# Patient Record
Sex: MALE | Race: WHITE | NOT HISPANIC OR LATINO | Employment: UNEMPLOYED | URBAN - NONMETROPOLITAN AREA
[De-identification: names, ages, dates, MRNs, and addresses within clinical notes are randomized per-mention and may not be internally consistent; named-entity substitution may affect disease eponyms.]

---

## 2017-02-09 ENCOUNTER — COMMUNICATION - GICH (OUTPATIENT)
Dept: FAMILY MEDICINE | Facility: OTHER | Age: 37
End: 2017-02-09

## 2017-02-09 ENCOUNTER — HOSPITAL ENCOUNTER (OUTPATIENT)
Dept: RADIOLOGY | Facility: OTHER | Age: 37
End: 2017-02-09
Attending: FAMILY MEDICINE

## 2017-02-09 ENCOUNTER — HISTORY (OUTPATIENT)
Dept: FAMILY MEDICINE | Facility: OTHER | Age: 37
End: 2017-02-09

## 2017-02-09 ENCOUNTER — OFFICE VISIT - GICH (OUTPATIENT)
Dept: FAMILY MEDICINE | Facility: OTHER | Age: 37
End: 2017-02-09

## 2017-02-09 DIAGNOSIS — R40.20 COMA (H): ICD-10-CM

## 2017-02-09 DIAGNOSIS — G44.52 NEW DAILY PERSISTENT HEADACHE (NDPH): ICD-10-CM

## 2017-03-10 ENCOUNTER — COMMUNICATION - GICH (OUTPATIENT)
Dept: FAMILY MEDICINE | Facility: OTHER | Age: 37
End: 2017-03-10

## 2017-03-12 ENCOUNTER — HISTORY (OUTPATIENT)
Dept: FAMILY MEDICINE | Facility: OTHER | Age: 37
End: 2017-03-12

## 2017-03-12 ENCOUNTER — OFFICE VISIT - GICH (OUTPATIENT)
Dept: FAMILY MEDICINE | Facility: OTHER | Age: 37
End: 2017-03-12

## 2017-03-12 DIAGNOSIS — B97.89 OTHER VIRAL AGENTS AS THE CAUSE OF DISEASES CLASSIFIED ELSEWHERE: ICD-10-CM

## 2017-03-12 DIAGNOSIS — J02.0 STREPTOCOCCAL PHARYNGITIS: ICD-10-CM

## 2017-03-12 DIAGNOSIS — J02.9 ACUTE PHARYNGITIS: ICD-10-CM

## 2017-03-12 DIAGNOSIS — J06.9 ACUTE UPPER RESPIRATORY INFECTION: ICD-10-CM

## 2017-03-12 LAB — STREP A ANTIGEN - HISTORICAL: POSITIVE

## 2017-05-22 ENCOUNTER — AMBULATORY - GICH (OUTPATIENT)
Dept: SCHEDULING | Facility: OTHER | Age: 37
End: 2017-05-22

## 2017-05-22 ENCOUNTER — OFFICE VISIT - GICH (OUTPATIENT)
Dept: SCHEDULING | Facility: OTHER | Age: 37
End: 2017-05-22

## 2017-06-07 ENCOUNTER — AMBULATORY - GICH (OUTPATIENT)
Dept: SCHEDULING | Facility: OTHER | Age: 37
End: 2017-06-07

## 2017-06-23 ENCOUNTER — HISTORY (OUTPATIENT)
Dept: FAMILY MEDICINE | Facility: OTHER | Age: 37
End: 2017-06-23

## 2017-06-23 ENCOUNTER — OFFICE VISIT - GICH (OUTPATIENT)
Dept: FAMILY MEDICINE | Facility: OTHER | Age: 37
End: 2017-06-23

## 2017-06-23 DIAGNOSIS — J01.00 ACUTE MAXILLARY SINUSITIS: ICD-10-CM

## 2017-07-04 ENCOUNTER — COMMUNICATION - GICH (OUTPATIENT)
Dept: FAMILY MEDICINE | Facility: OTHER | Age: 37
End: 2017-07-04

## 2017-07-04 DIAGNOSIS — M10.072 IDIOPATHIC GOUT, LEFT ANKLE AND FOOT: ICD-10-CM

## 2017-07-04 DIAGNOSIS — E78.1 PURE HYPERGLYCERIDEMIA: ICD-10-CM

## 2017-07-10 ENCOUNTER — COMMUNICATION - GICH (OUTPATIENT)
Dept: FAMILY MEDICINE | Facility: OTHER | Age: 37
End: 2017-07-10

## 2017-07-10 DIAGNOSIS — M10.072 IDIOPATHIC GOUT, LEFT ANKLE AND FOOT: ICD-10-CM

## 2017-07-10 DIAGNOSIS — E78.1 PURE HYPERGLYCERIDEMIA: ICD-10-CM

## 2017-10-25 ENCOUNTER — HISTORY (OUTPATIENT)
Dept: FAMILY MEDICINE | Facility: OTHER | Age: 37
End: 2017-10-25

## 2017-10-25 ENCOUNTER — OFFICE VISIT - GICH (OUTPATIENT)
Dept: FAMILY MEDICINE | Facility: OTHER | Age: 37
End: 2017-10-25

## 2017-10-25 DIAGNOSIS — E78.1 PURE HYPERGLYCERIDEMIA: ICD-10-CM

## 2017-10-25 DIAGNOSIS — I10 ESSENTIAL (PRIMARY) HYPERTENSION: ICD-10-CM

## 2017-10-25 DIAGNOSIS — S39.012A STRAIN OF MUSCLE, FASCIA AND TENDON OF LOWER BACK, INITIAL ENCOUNTER: ICD-10-CM

## 2017-10-25 DIAGNOSIS — M10.072 IDIOPATHIC GOUT, LEFT ANKLE AND FOOT: ICD-10-CM

## 2017-12-27 NOTE — PROGRESS NOTES
Patient Information     Patient Name MRN Sex Nura Madsen [id number] Male 1980      Progress Notes by Justyna Arnold at 2017  9:32 AM     Author:  Justyna Arnold Service:  (none) Author Type:  (none)     Filed:  2017  9:32 AM Encounter Date:  2017 Status:  Signed     :  Justyna Arnold              This encounter was opened in error.  Please disregard.

## 2017-12-27 NOTE — PROGRESS NOTES
"Patient Information     Patient Name MRN Sex Nura Madsen [id number] Male 1980      Progress Notes by Angela Perez MD at 10/25/2017  9:30 AM     Author:  Angela Perez MD Service:  (none) Author Type:  Physician     Filed:  10/25/2017  2:43 PM Encounter Date:  10/25/2017 Status:  Signed     :  Angela Perez MD (Physician)            SUBJECTIVE:    Nura Gonzalez is a 37 y.o. male who complains of low back pain for 2 weeks and woke up with discomfort and can recall no injury but does have a son age 1 1/2 that he picks up and plays with which could have initiated it. He is working at a sedentary job but does get to change positions during the day and is currently a para in an elementary school. He hasn't done any heavy lifting raking or yard work recently. He has gained quite a bit of weight over the last year and has been on Abilify from Dr. Blas. He does know that he should lose some weight. The pain is in the lumbar area really does not radiate and is central and bilateral quite low. He has no real SI joint tenderness today.    OBJECTIVE:  /92  Pulse 72  Ht 1.85 m (6' 0.83\")  Wt 132 kg (291 lb)  BMI 38.57 kg/m2   Wt Readings from Last 4 Encounters:    10/25/17 132 kg (291 lb)   17 129.3 kg (285 lb)   17 127 kg (280 lb)   17 122 kg (269 lb)        Patient appears to be in mild discomfort Lumbosacral spine area reveals no local tenderness or mass.  Paraspinal muscle spasm noted bilaterally and is fairly symmetric. He can flex forward without significant difficulty and extend at the lumbar spine. No evidence of antalgic gait or scoliosis or curvature. Extremely large abdominal girth.    ASSESSMENT:   1. Strain of lumbar region, initial encounter    2. Acute idiopathic gout involving toe of left foot    3. Hypertriglyceridemia    4. Hypertension          PLAN:  For acute pain, rest, intermittent application of heat (do not sleep on heating pad), " analgesics-ibuprofen or Aleve  and muscle relaxants-prescription for Flexeril at night are recommended. Discussed longer term treatment plan of prn NSAID's and discussed a home back care exercise program with flexion exercise routine.  Consider Physical Therapy or chiropractic and XRay studies if not improving. Call or return to clinic prn if these symptoms worsen or fail to improve as anticipated.  Refills needed on a couple of medications and is advised to follow-up for a full physical and lab.  Angela Perez MD  2:42 PM 10/25/2017     Portions of this dictation were created using the Dragon Nuance voice recognition system. Proofreading was completed but there may be errors in text.

## 2017-12-28 NOTE — PATIENT INSTRUCTIONS
Patient Information     Patient Name MRN Sex Nura Madsen [id number] Male 1980      Patient Instructions by Maria Teresa Thakur NP at 2017  2:45 PM     Author:  Maria Teresa Thakur NP Service:  (none) Author Type:  PHYS- Nurse Practitioner     Filed:  2017  3:25 PM Encounter Date:  2017 Status:  Signed     :  Maria Teresa Thakur NP (PHYS- Nurse Practitioner)            What you should do:    Wash your hands often with soap and water    Get plenty of rest and fluids    Apply warm compresses to your face    Use sinus rinses, a humidifier or a vaporizer to add more moisture to your sinus tissues.    Think about using a Neti pot    Take acetaminophen (Tylenol ) or ibuprofen (Advil ) for pain    How will you know this plan is not working - warning signs you should watch for:    Pain or swelling around your eyes    Swollen, painful forehead and/or facial (sinus) pain    When should you be seen again?    If you develop severe headache, double vision, stiff neck or confusion, return right away.    If you have any of the warning signs listed above, return in 1 to 2 days.    If your symptoms do not get better in 7 to 10 days, return at that time.

## 2017-12-28 NOTE — PROGRESS NOTES
Patient Information     Patient Name MRN Sex     Nura Gonzalez [id number] Male 1980      Progress Notes by Maria Teresa Thakur NP at 2017  2:45 PM     Author:  Maria Teresa Thakur NP Service:  (none) Author Type:  PHYS- Nurse Practitioner     Filed:  2017  3:31 PM Encounter Date:  2017 Status:  Signed     :  Maria Teresa Thakur NP (PHYS- Nurse Practitioner)            Nursing Notes:   Leslee Jerome BETTY  2017  3:24 PM  Signed  Patient is here today with c/o pain in his teeth, drainage in the back of his throat, and yellow/green mucus. Leslee Jerome LPN......................2017 3:04 PM    SUBJECTIVE:    Nuar Gonzalez is a 37 y.o. male who presents for Sinus pain and pressure.     Sinus Problem   This is a new problem. Episode onset: 4 weeks. The problem is unchanged. There has been no fever. The pain is moderate. Associated symptoms include congestion, headaches and sinus pressure. Pertinent negatives include no coughing, diaphoresis, ear pain, hoarse voice, neck pain, shortness of breath, sneezing, sore throat or swollen glands. Past treatments include oral decongestants, saline sprays and spray decongestants. The treatment provided mild relief.       Current Outpatient Prescriptions on File Prior to Visit       Medication  Sig Dispense Refill     allopurinol (ZYLOPRIM) 300 mg tablet Take 1 tablet by mouth once daily. 90 tablet 4     ARIPiprazole (ABILIFY) 10 mg tablet Take 1 tablet by mouth once daily.  0     clonazePAM (KLONOPIN) 1 mg tablet TAKE ONE TABLET BY MOUTH TWICE DAILY 40 tablet 0     fenofibrate nanocrystallized (TRICOR) 145 mg tablet Take 1 tablet by mouth once daily with a meal. 90 tablet 4     methylphenidate (CONCERTA) 36 mg Extended-Release tablet Take 1 tablet by mouth once daily. Dx: ADHD  0     omega-3 fatty acids-vitamin E (FISH OIL) 1,000 mg cap 4 DAILY  0     propranolol (INDERAL LA) 120 mg Cs24 Sustained-Release capsule Take 1 capsule by mouth once daily.  0  "    No current facility-administered medications on file prior to visit.        REVIEW OF SYSTEMS:  Review of Systems   Constitutional: Negative for diaphoresis.   HENT: Positive for congestion and sinus pressure. Negative for ear pain, hoarse voice, sneezing and sore throat.    Respiratory: Negative for cough and shortness of breath.    Musculoskeletal: Negative for neck pain.   Neurological: Positive for headaches.       OBJECTIVE:  /80  Pulse 88  Temp 97.6  F (36.4  C) (Tympanic)  Ht 1.854 m (6' 1\")  Wt 129.3 kg (285 lb)  BMI 37.6 kg/m2    EXAM:   Physical Exam   Constitutional: He is well-developed, well-nourished, and in no distress.   HENT:   Head: Normocephalic and atraumatic.   Right Ear: Tympanic membrane and ear canal normal.   Left Ear: Tympanic membrane and ear canal normal.   Nose: Mucosal edema present. Right sinus exhibits no maxillary sinus tenderness and no frontal sinus tenderness. Left sinus exhibits maxillary sinus tenderness. Left sinus exhibits no frontal sinus tenderness.   Eyes: Conjunctivae are normal.   Neck: Neck supple.   Cardiovascular: Normal rate, regular rhythm and normal heart sounds.    Pulmonary/Chest: Effort normal and breath sounds normal. No respiratory distress. He has no wheezes. He has no rales.   Lymphadenopathy:     He has no cervical adenopathy.   Skin: Skin is warm and dry.   Nursing note and vitals reviewed.      ASSESSMENT/PLAN:    ICD-10-CM    1. Acute non-recurrent maxillary sinusitis J01.00 amoxicillin-clavulanate 875-125 mg tablet (AUGMENTIN)        Plan:  Home cares and OTC gone over. ABX gone over. I explained my diagnostic considerations and recommendations to the patient, who voiced understanding and agreement with the treatment plan. All questions were answered. We discussed potential side effects of any prescribed or recommended therapies, as well as expectations for response to treatments. He was advised to contact our office if there is no " improvement or worsening of conditions or symptoms.  If s/s worsen or persist, patient will either come back or follow up with PCP.       BUBBA ROMANO NP ....................  6/23/2017   3:30 PM

## 2017-12-28 NOTE — TELEPHONE ENCOUNTER
Patient Information     Patient Name MRN Sex Nura Madsen [id number] Male 1980      Telephone Encounter by Shila Sage RN at 7/10/2017  2:33 PM     Author:  Shila Sage RN Service:  (none) Author Type:  NURS- Registered Nurse     Filed:  7/10/2017  2:36 PM Encounter Date:  7/10/2017 Status:  Signed     :  Shila Sage RN (NURS- Registered Nurse)            Fibrates    Office visit in the past 12 months.    Last visit with KASHIF HANEY was on: 2016 in [facility]  Next visit with KASHIF HANEY is on: No future appointment listed with this provider  Next visit with Family Practice is on: No future appointment listed in this department    Lab testing requirements:  Lipids annually.  Repeat lipids 6-8 weeks after dosage or drug change.    Last Lipids:  Chol: 204    2016  T    2016  HDL:   25    2016  LDL:  No results found in past 5 years    .  LDL DIRECT:  No results found in past 5 years    .    Concommitant use of fibrates and statins-If it is an addition to the medication list, review note and/or discuss with provider.  If already on medication list, refill.    Max refills 12 months from last office visit.      GOUT    Office visit in the past 12 months or per provider note.    Max refill for 12 months from last office visit or per provider note.    Patient is due for medication management appointment. Limited refill provided at this time. [employer] message and/or letter sent for reminder to patient. Prescription refilled per RN Medication Refill Policy.................... Shila Sage RN ....................  7/10/2017   2:35 PM

## 2017-12-28 NOTE — PATIENT INSTRUCTIONS
Patient Information     Patient Name MRN Sex Nura Madsen [id number] Male 1980      Patient Instructions by Angela Perez MD at 10/25/2017 10:00 AM     Author:  Angela Perez MD  Service:  (none) Author Type:  Physician     Filed:  10/25/2017 10:00 AM  Encounter Date:  10/25/2017 Status:  Addendum     :  Angela Perez MD (Physician)        Related Notes: Original Note by Angela Perez MD (Physician) filed at 10/25/2017 10:00 AM               Index Swedish All languages Exercises Related topics   Low Back Pain   ________________________________________________________________________  KEY POINTS    Pain and stiffness in the lower back is a common condition.    Low back pain can be treated with exercise, ice, moist heat, and sometimes with medicine or surgery.    Keeping your muscles strong, using good posture, and learning the correct way to lift heavy objects can help prevent problems.  ________________________________________________________________________  What is low back pain?   Pain and stiffness in the lower back is a common condition. It is one of the most common reasons people miss work.   In the center of your lower back are 5 bones in the spine called lumbar vertebrae. Muscles and ligaments help keep the vertebrae in their proper position. In between the vertebrae are gel-like shock absorbers called disks. Nerves that lead to the lower body pass through the bones of the lower back.   What is the cause?  You may have pain if any part of your back is injured, strained, or affected by illness.   The most common causes of back pain include:    Frequent lifting or carrying of heavy objects    Spending a lot of time sitting or standing in one position or bending over    Being overweight    A degenerative condition (a problem that causes the bones, joints, disks, or muscles to break down, like arthritis)  Less common causes of back pain include:    A disk that bulges or is  pushed out of place by injury or a severe strain. A bulging (herniated) disk can pinch the nerves that pass through the bones, leading to pain in the legs.    Injuries caused by a fall, unusually strenuous exercise, or even violent sneezing or coughing    Swelling and irritation from an infection or an immune system problem    A congenital condition (a problem that you were born with)  What are the symptoms?  Symptoms include:    Pain in the back, buttocks, or legs    Weakness in the legs    Tingling or numbness in the legs or feet    Stiffness, spasms, or limited motion  The pain may be constant or may happen only in certain positions. It may get worse when you cough, sneeze, bend, twist, or strain during a bowel movement. The pain may be in only one spot or it may spread to other areas, most commonly down the buttocks and into the back of the thigh.  How is it diagnosed?   Your healthcare provider will review your medical history and examine you. You may have X-rays or an MRI of your back.  How is it treated?   The treatment for low back pain depends on the cause. Your healthcare provider may recommend:    Rest. It's best to try to stay active, so try not to rest in bed longer than 1 to 2 days or the time your provider recommends.    Exercise. Your provider may recommend physical therapy or exercises that you can do at home.    Medicine. Several types of medicines may help lessen back pain. Take all medicine as recommended by your healthcare provider.    Surgery. Depending on the cause of your back pain and if you keep having symptoms, you may need to have surgery. However, most common causes of back pain don t need surgery.  How can I take care of myself?   To help relieve pain:    Put an ice pack, gel pack, or package of frozen vegetables, wrapped in a cloth on the painful area every 3 to 4 hours for up to 20 minutes at a time.    Take an anti-inflammatory medicine, such as ibuprofen, or other medicine as  directed by your healthcare provider. Nonsteroidal anti-inflammatory medicines (NSAIDs), such as ibuprofen, may cause stomach bleeding and other problems. These risks increase with age. Read the label and take as directed. Unless recommended by your healthcare provider, do not take for more than 10 days.    Put a hot water bottle or electric heating pad on your back. Cover the hot water bottle with a towel or set the heating pad on low so you don t burn your skin.   Try putting moist heat on the injured area for 10 to 15 minutes at a time before you do warm-up and stretching exercises. Moist heat may help relax your muscles and make it easier to move your body. Moist heat includes heat patches or moist heating pads that you can purchase at most drugstores, a wet washcloth or towel that has been heated in the dryer, or a hot shower.   Don t use heat if you have swelling.     Get a back massage by someone trained in giving massages.    Talk with a counselor if your back pain is related to tension caused by emotional problems.  Pain is the best way to  the pace you should set for increasing your activity and exercise. Minor discomfort, stiffness, soreness, and mild aches don t need to limit your activity.   Ask your healthcare provider:    How and when you will get your test results    How long it will take to recover from this condition    If there are activities you should avoid and when you can return to your normal activities    How to take care of yourself at home    What symptoms or problems you should watch for and what to do if you have them  Make sure you know when you should come back for a checkup.  How can I help prevent low back pain?   Here are some of the things you can do so there is less strain on your back:    Keep your abdominal and back muscles strong. Get some exercise every day and include stretching and warm-up exercises suggested by your provider or physical therapist. Exercising regularly  will not only help your back. It will also help keep you healthier overall.    Practice good posture.    Stand with your head up, shoulders straight, chest forward, weight balanced evenly on both feet, and pelvis tucked in.    Whenever you sit, sit in a straight-backed chair and hold your spine against the back of the chair. You may want to try a standing desk if you sit at a desk for a long time.    Use a footrest for one foot when you stand or sit in one spot for a long time. This keeps your back straight.    Protect your back.    When you need to move a heavy object, don't face the object and push with your arms. Turn around and use your back to push backwards so the strain is taken by your legs.    When you lift a heavy object, bend your knees and hips and keep your back straight. If you do a lot of heavy lifting, wear a belt designed to support your back. Avoid lifting heavy objects higher than your waist.    Carry packages close to your body, with your arms bent.    Lie on your side with your knees bent when you sleep or rest. It may help to put a pillow between your knees. Put a pillow under your knees when you sleep on your back. You may need to avoid sleeping on your stomach.    Lose weight if you are overweight.  Developed by [employer].  Adult Advisor 2016.3 published by [employer].  Last modified: 2016-05-19  Last reviewed: 2016-05-18  This content is reviewed periodically and is subject to change as new health information becomes available. The information is intended to inform and educate and is not a replacement for medical evaluation, advice, diagnosis or treatment by a healthcare professional.  References   Adult Advisor 2016.3 Index    Copyright   2016 [employer], a division of [employer] All rights reserved.

## 2017-12-28 NOTE — TELEPHONE ENCOUNTER
Patient Information     Patient Name MRN Sex Nura Madsen [id number] Male 1980      Telephone Encounter by Lady Butterfield RN at 2017  3:22 PM     Author:  Lady Butterfield RN Service:  (none) Author Type:  NURS- Registered Nurse     Filed:  2017  3:22 PM Encounter Date:  2017 Status:  Signed     :  Lady Butterfield RN (NURS- Registered Nurse)            Both refilled 17 for 90 days.  Unable to complete prescription refill per RN Medication Refill Policy.................... LADY BUTTERFIELD RN ....................  2017   3:22 PM

## 2017-12-30 NOTE — NURSING NOTE
Patient Information     Patient Name MRN Sex Nura Madsen [id number] Male 1980      Nursing Note by Leslee Jerome at 2017  3:00 PM     Author:  Leslee Jerome Service:  (none) Author Type:  (none)     Filed:  2017  3:24 PM Encounter Date:  2017 Status:  Signed     :  Leslee Jerome            Patient is here today with c/o pain in his teeth, drainage in the back of his throat, and yellow/green mucus. Leslee Jerome LPN......................2017 3:04 PM

## 2018-01-02 ENCOUNTER — HISTORY (OUTPATIENT)
Dept: FAMILY MEDICINE | Facility: OTHER | Age: 38
End: 2018-01-02

## 2018-01-02 ENCOUNTER — OFFICE VISIT - GICH (OUTPATIENT)
Dept: FAMILY MEDICINE | Facility: OTHER | Age: 38
End: 2018-01-02

## 2018-01-02 DIAGNOSIS — J02.0 STREPTOCOCCAL PHARYNGITIS: ICD-10-CM

## 2018-01-02 DIAGNOSIS — J02.9 ACUTE PHARYNGITIS: ICD-10-CM

## 2018-01-02 LAB — STREP A ANTIGEN - HISTORICAL: POSITIVE

## 2018-01-03 NOTE — TELEPHONE ENCOUNTER
"Patient Information     Patient Name MRN Sex Nura Madsen [id number] Male 1980      Telephone Encounter by Yanelis Peacock RN at 2017  9:05 AM     Author:  Yanelis Peacock RN Service:  (none) Author Type:  NURS- Registered Nurse     Filed:  2017  9:14 AM Encounter Date:  2017 Status:  Signed     :  Yanelis Peacock RN (NURS- Registered Nurse)            Patient does not have a ride at this time, he is waiting for his wife to get home. He does have an appointment scheduled for this afternoon, but he will present to ED if his wife is able to bring him in sooner. He was advised to call 911 with worsening symptoms.    Reason for Disposition    Any head or face injury    Answer Assessment - Initial Assessment Questions  1. ONSET: \"How long were you unconscious?\" (minutes) \"When did it happen?\"      10-10:30 pm last night, out for just a few seconds  2. CONTENT: \"What happened during period of unconsciousness?\" (e.g., seizure activity)       unknown  3. MENTAL STATUS: \"Alert and oriented now?\" (oriented x 3 = name, month, location)       Oriented x3  4. TRIGGER: \"What do you think caused the fainting?\" \"What were you doing just before you fainted?\"  (e.g., exercise, sudden standing up, prolonged standing)      Walking to the garbage to throw something away  5. RECURRENT SYMPTOM: \"Have you ever passed out before?\" If so, ask: \"When was the last time?\" and \"What happened that time?\"       no  6. INJURY: \"Did you sustain any injury during the fall?\"       Back and head are sore, states he was face down when his wife woke him up  7. CARDIAC SYMPTOMS: \"Have you had any of the following symptoms: chest pain, difficulty breathing, palpitations?\"      No  8. NEUROLOGIC SYMPTOMS: \"Have you had any of the following symptoms: headache, numbness, vertigo, weakness?\"      Headache   9. GI SYMPTOMS: \"Have you had any of the following symptoms: abdominal pain, vomiting, diarrhea, blood in " "stools?\"      no  10. OTHER SYMPTOMS: \"Do you have any other symptoms?\"      no  11. PREGNANCY: \"Is there any chance you are pregnant?\" \"When was your last menstrual period?\"      N/a - male    Protocols used: ADULT [id number]            "

## 2018-01-03 NOTE — PATIENT INSTRUCTIONS
Patient Information     Patient Name MRN Sex Nura Madsen [id number] Male 1980      Patient Instructions by Araceli Womack NP at 3/12/2017  2:30 PM     Author:  Araceli Womack NP Service:  (none) Author Type:  PHYS- Nurse Practitioner     Filed:  3/12/2017  3:28 PM Encounter Date:  3/12/2017 Status:  Signed     :  Araceli Womack NP (PHYS- Nurse Practitioner)            Positive rapid strep test    What you should do:    If you are prescribed antibiotics:    take all the medicine as directed    get a new toothbrush to start using 24 hours after starting the antibiotics    stay home from school or work until you have been on antibiotics for at least 24 hours    Get plenty of fluids to stay well hydrated    Make sure that you get plenty of rest    Take acetaminophen (Tylenol ) or ibuprofen (Motrin , Advil ) for fever or discomfort if needed.  Follow your health care provider s or the package directions.       Eating freezer treats, such as Popsicles  and ice cream can ease sore throat pain    If you had a throat culture you should receive the results within 3 days. Please call the clinic if you have not been notified of your results after 3 days.    How will you know this plan is not working - warning signs you should watch for:    You get new symptoms you are worried about    You:  o have little energy  o are hard to wake up  o don t want to drink fluids  o have little or lack of urine    When should you be seen again?    If you have trouble swallowing your saliva, go to the Emergency Room right away    If you have any of the symptoms listed, above return right away    If your fever or throat pain does not improve within three days, return at that time    Who should you see if the plan is not working?    Make an appointment to see your primary care provider or clinic.      Follow up in clinic if:  You have a fever of at least 101 F or 38.4 C   Your throat pain is severe or does not  start to improve within 5 to 7 days    Call 9-1-1 or go to the emergency room if you:  Have trouble breathing   Are drooling because you cannot swallow your saliva   Have swelling of the neck or tongue   Cannot move your neck or have trouble opening your mouth

## 2018-01-03 NOTE — TELEPHONE ENCOUNTER
Patient Information     Patient Name MRN Sex Nura Madsen [id number] Male 1980      Telephone Encounter by Angela Perez MD at 3/10/2017 10:35 AM     Author:  Angela Perez MD Service:  (none) Author Type:  Physician     Filed:  3/10/2017 10:36 AM Encounter Date:  3/10/2017 Status:  Signed     :  Angela Perez MD (Physician)            This patient has a sleep study ordered and it does not  in till 2017.  I suspect that they were unable to reach him to schedule this. Please initiated through the schedulers.  Angela Perez MD  10:36 AM 3/10/2017

## 2018-01-03 NOTE — PROGRESS NOTES
Patient Information     Patient Name MRN Sex Nura Madsen [id number] Male 1980      Progress Notes by Araceli Womack NP at 3/12/2017  2:30 PM     Author:  Araceli Womack NP Service:  (none) Author Type:  PHYS- Nurse Practitioner     Filed:  3/12/2017  4:23 PM Encounter Date:  3/12/2017 Status:  Signed     :  Araceli Womack NP (PHYS- Nurse Practitioner)            HPI:    Nura Gonzalez is a 37 y.o. male who presents to clinic today for strep testing.   Sore throat x 3 days.  Painful to swallow.  Loss of taste.  Runny and stuffy nose, clearing up now during day but still present at night.  Congested cough.  No chest tightness.  No shortness of breath.  Fevers up to 103.6 at night, reduces with Ibuprofen.  Chills and sweats.  Appetite decreased.  Energy decreased.  Headaches.  Body aches.  Just taking Ibuprofen.  No flu shot.              No past medical history on file.  Past Surgical History       Procedure   Laterality Date     Remvoval of sesamoid bone in foot  Left      [place] teeth extraction        Knee arthroscopy  Right      Esophagogastroduodenoscopy   3/5/2014             Social History        Substance Use Topics          Smoking status:   Former Smoker      Packs/day:  1.00      Years:  15.00      Quit date:  2013      Smokeless tobacco:   Never Used      Alcohol use   0.0 oz/week     0 Standard drinks or equivalent per week        Comment: RARE       Current Outpatient Prescriptions       Medication  Sig Dispense Refill     allopurinol (ZYLOPRIM) 300 mg tablet Take 1 tablet by mouth once daily. 90 tablet 4     ARIPiprazole (ABILIFY) 10 mg tablet Take 1 tablet by mouth once daily.  0     clonazePAM (KLONOPIN) 1 mg tablet TAKE ONE TABLET BY MOUTH TWICE DAILY 40 tablet 0     fenofibrate nanocrystallized (TRICOR) 145 mg tablet Take 1 tablet by mouth once daily with a meal. 90 tablet 4     methylphenidate (CONCERTA) 36 mg Extended-Release tablet Take 1 tablet by mouth once  "daily. Dx: ADHD  0     omega-3 fatty acids-vitamin E (FISH OIL) 1,000 mg cap 4 DAILY  0     propranolol (INDERAL LA) 120 mg Cs24 Sustained-Release capsule Take 1 capsule by mouth once daily.  0     No current facility-administered medications for this visit.      Medications have been reviewed by me and are current to the best of my knowledge and ability.    No Known Allergies    ROS:  Refer to HPI    Visit Vitals       /84     Pulse 68     Temp 98.6  F (37  C) (Tympanic)     Ht 1.88 m (6' 2\")     Wt 122 kg (269 lb)     BMI 34.54 kg/m2       EXAM:  General Appearance:   Miserable appearing adult male, appropriate appearance for age. No acute distress  Head: normocephalic, atraumatic  Ears: Left TM with bony landmarks appreciated, no erythema, no effusion, no bulging, no purulence.  Right TM with bony landmarks appreciated, no erythema, no effusion, no bulging, no purulence.   Left auditory canal clear.  Right auditory canal clear.  Normal external ears, non tender.  Eyes: conjunctivae normal, no drainage  Orophayrnx: moist mucous membranes, posterior pharynx with bright erythema, tonsils with 1-2+ hypertrophy with erythema, no exudates or petechiae, no post nasal drip seen.    Neck: supple without adenopathy, no lymph node enlargement on palpation, tender to palpation over right tonsillar area   Respiratory: normal chest wall and respirations.  Normal effort.  Clear to auscultation bilaterally, no wheezes or rhonchi or congestion, no cough appreciated   Cardiac: RRR with no murmurs  Psychological: normal affect, alert and pleasant      Labs:  Results for orders placed or performed in visit on 03/12/17      THROAT RAPID STREP A WITH REFLEX      Result  Value Ref Range    STREP A ANTIGEN           Positive (A) Negative           ASSESSMENT/PLAN:    ICD-10-CM    1. Sore throat J02.9 THROAT RAPID STREP A WITH REFLEX      THROAT RAPID STREP A WITH REFLEX   2. Strep pharyngitis J02.0 penicillin g benzathine " 1,200,000 Units injection (BICILLIN-LA)   3. Viral URI with cough J06.9      B97.89          Positive rapid strep test  Penicillin G Benzathine 1.2 million units IM injection x 1 administered in clinic  New toothbrush in 24 hours   Encouraged fluids  Symptomatic treatment - salt water gargles, honey, elevation, humidifier, sinus rinse/netti pot, lozenges, etc   Tylenol or ibuprofen PRN  Follow up if symptoms persist or worsen or concerns            Patient Instructions   Positive rapid strep test    What you should do:    If you are prescribed antibiotics:    take all the medicine as directed    get a new toothbrush to start using 24 hours after starting the antibiotics    stay home from school or work until you have been on antibiotics for at least 24 hours    Get plenty of fluids to stay well hydrated    Make sure that you get plenty of rest    Take acetaminophen (Tylenol ) or ibuprofen (Motrin , Advil ) for fever or discomfort if needed.  Follow your health care provider s or the package directions.       Eating freezer treats, such as Popsicles  and ice cream can ease sore throat pain    If you had a throat culture you should receive the results within 3 days. Please call the clinic if you have not been notified of your results after 3 days.    How will you know this plan is not working - warning signs you should watch for:    You get new symptoms you are worried about    You:  o have little energy  o are hard to wake up  o don t want to drink fluids  o have little or lack of urine    When should you be seen again?    If you have trouble swallowing your saliva, go to the Emergency Room right away    If you have any of the symptoms listed, above return right away    If your fever or throat pain does not improve within three days, return at that time    Who should you see if the plan is not working?    Make an appointment to see your primary care provider or clinic.      Follow up in clinic if:  You have a fever  of at least 101 F or 38.4 C   Your throat pain is severe or does not start to improve within 5 to 7 days    Call 9-1-1 or go to the emergency room if you:  Have trouble breathing   Are drooling because you cannot swallow your saliva   Have swelling of the neck or tongue   Cannot move your neck or have trouble opening your mouth

## 2018-01-03 NOTE — NURSING NOTE
Patient Information     Patient Name MRN Sex Nura Madsen [id number] Male 1980      Nursing Note by Rayna Jade at 2017  3:00 PM     Author:  Rayna Jade Service:  (none) Author Type:  (none)     Filed:  2017  3:23 PM Encounter Date:  2017 Status:  Signed     :  Rayna Jade            Patient presents with a head injury. States he blacked out last night and hit his head. He is having headaches.  Rayna Jade LPN .........................2017  3:01 PM

## 2018-01-03 NOTE — TELEPHONE ENCOUNTER
Patient Information     Patient Name MRN Sex Nura Madsen [id number] Male 1980      Telephone Encounter by Padmini Herrera at 3/10/2017 10:24 AM     Author:  Padmini Herrera Service:  (none) Author Type:  (none)     Filed:  3/10/2017 10:25 AM Encounter Date:  3/10/2017 Status:  Signed     :  Padmini Herrera            Please see 2016 note  Padmini Herrera LPN........................3/10/2017  10:25 AM

## 2018-01-03 NOTE — PROGRESS NOTES
Patient Information     Patient Name MRN Sex Nura Madsen [id number] Male 1980      Progress Notes by Andi Yao MD at 2017  3:00 PM     Author:  Andi Yao MD Service:  (none) Author Type:  Physician     Filed:  2/10/2017  8:01 AM Encounter Date:  2017 Status:  Signed     :  Andi Yao MD (Physician)            SUBJECTIVE:    Nura Gonzalez is a 37 y.o. male who presents for sudden loss of consciousness    HPI Comments: Patient arrives here after experiencing a sudden loss of consciousness. Approximately 10:00 last night he had just finished eating brussels sprouts when he is walking to the garage. He suddenly lost consciousness. His significant other was in bed reading heard a loud crash. She subsequently went home and found him on the floor trying to get up. States he was confused at the time but cleared up after a minute or 2. States he hit the floor hard. Does not know if he had any trauma to his head reports he had a severe headache afterwards. He did not have any incontinence or biting his tongue. The headache has improved but continues to persist on the left side. No history of loss of consciousness in the past. He denies any palpitations prior to the episode. No seizures or shaking prior to the episode. He became suddenly. He had been standing prior to the episode.      No Known Allergies,   Family History       Problem   Relation Age of Onset     Hyperlipidemia  Mother      Hypertension  Mother      Psychiatric illness  Mother      Depression        Other  Mother      Chronic back pain       Diabetes  Brother      Other  Brother      Tourette's       Diabetes  Other      Nephew-type 1     ,   Current Outpatient Prescriptions on File Prior to Visit       Medication  Sig Dispense Refill     allopurinol (ZYLOPRIM) 300 mg tablet Take 1 tablet by mouth once daily. 90 tablet 4     ARIPiprazole (ABILIFY) 10 mg tablet Take 1 tablet by mouth once daily.  0      clonazePAM (KLONOPIN) 1 mg tablet TAKE ONE TABLET BY MOUTH TWICE DAILY 40 tablet 0     fenofibrate nanocrystallized (TRICOR) 145 mg tablet Take 1 tablet by mouth once daily with a meal. 90 tablet 4     methylphenidate (CONCERTA) 36 mg Extended-Release tablet Take 1 tablet by mouth once daily. Dx: ADHD  0     omega-3 fatty acids-vitamin E (FISH OIL) 1,000 mg cap 4 DAILY  0     propranolol (INDERAL LA) 120 mg Cs24 Sustained-Release capsule Take 1 capsule by mouth once daily.  0     No current facility-administered medications on file prior to visit.    ,   Past Surgical History       Procedure   Laterality Date     Remvoval of sesamoid bone in foot  Left      [place] teeth extraction        Knee arthroscopy  Right      Esophagogastroduodenoscopy   3/5/2014           ,   Social History        Substance Use Topics          Smoking status:   Former Smoker      Packs/day:  1.00      Years:  15.00      Quit date:  8/17/2013      Smokeless tobacco:   Never Used      Alcohol use   0.0 oz/week     0 Standard drinks or equivalent per week        Comment: RARE      and   Social History        Substance Use Topics          Smoking status:   Former Smoker      Packs/day:  1.00      Years:  15.00      Quit date:  8/17/2013      Smokeless tobacco:   Never Used      Alcohol use   0.0 oz/week     0 Standard drinks or equivalent per week        Comment: RARE         REVIEW OF SYSTEMS:  Review of Systems   Constitutional: Negative for chills, fever and malaise/fatigue.   Eyes: Negative for blurred vision.   Respiratory: Negative for cough.    Cardiovascular: Negative for chest pain and palpitations.   Gastrointestinal: Negative for heartburn, nausea and vomiting.   Musculoskeletal: Negative for neck pain.   Skin: Negative for rash.   Neurological: Positive for headaches. Negative for dizziness, sensory change and speech change.   Psychiatric/Behavioral: Negative for memory loss.       OBJECTIVE:  /72  Pulse 66  Temp 97.6  F (36.4   C) (Tympanic)  Wt 123.4 kg (272 lb)  BMI 36.36 kg/m2    EXAM:   Physical Exam   Constitutional: He is well-developed, well-nourished, and in no distress.   HENT:   Head: Normocephalic and atraumatic.   Right Ear: External ear normal.   Left Ear: External ear normal.   Mouth/Throat: No oropharyngeal exudate.   Eyes: Conjunctivae and EOM are normal. Pupils are equal, round, and reactive to light. No scleral icterus.   Neck: Normal range of motion. Neck supple.   Cardiovascular: Normal rate, regular rhythm and normal heart sounds.    No murmur heard.  Pulmonary/Chest: Effort normal and breath sounds normal.   Abdominal: Soft.   Neurological: He is alert. He displays normal reflexes. Gait normal.   Past alternating movement finger-nose-finger normal. Romberg unremarkable. No clonus present.   Skin: Skin is warm and dry.   Psychiatric: Affect normal.   CT scan negative for bleed. EKG sinus rhythm.   ASSESSMENT/PLAN:    ICD-10-CM    1. LOC (loss of consciousness) (HC) R40.20 CT HEAD BRAIN WO      SC ELECTROCARDIOGRAM COMPLETE   2. New daily persistent headache G44.52 EKG 12 LEAD UNIT PERFORMED        Plan:  Episode of loss of consciousness. Possibly a vasovagal. Etiology is unclear. He does not give a history of an arrhythmia. No history of seizure. Discussed potential possibilities with the patient and significant other. At this time recommended just monitoring for now. They are agreeable. He denies any new medications. No other changes in his health. Further workup if symptoms should recur.

## 2018-01-03 NOTE — NURSING NOTE
Patient Information     Patient Name MRN Sex Nura Madsen [id number] Male 1980      Nursing Note by Deja Calderon at 3/12/2017  2:30 PM     Author:  Deja Calderon Service:  (none) Author Type:  (none)     Filed:  3/12/2017  3:08 PM Encounter Date:  3/12/2017 Status:  Signed     :  Deja Calderon            He has had a sore throat for 4 days. He states he has a fever off and on but especially at night.  Deja Calderon LPBETTY..................3/12/2017   3:04 PM

## 2018-01-03 NOTE — TELEPHONE ENCOUNTER
Patient Information     Patient Name MRN Sex Nura Madsen [id number] Male 1980      Telephone Encounter by Padmini Herrera at 3/10/2017 10:39 AM     Author:  Padmini Herrera Service:  (none) Author Type:  (none)     Filed:  3/10/2017 10:40 AM Encounter Date:  3/10/2017 Status:  Signed     :  Padmini Herrera            Please call patient for sleep study.   Padmini Herrera LPN........................3/10/2017  10:40 AM

## 2018-01-05 NOTE — NURSING NOTE
Patient Information     Patient Name MRN Sex Nura Madsen [id number] Male 1980      Nursing Note by Jaylyn Galeano at 2017  3:00 PM     Author:  Jaylyn Galeano Service:  (none) Author Type:  (none)     Filed:  2017  2:57 PM Encounter Date:  2017 Status:  Signed     :  Jaylyn Galeano            Patient has had apneic spells and snoring.  Jaylyn Galeano LPN.......................... 2017  2:53 PM

## 2018-01-27 VITALS
HEART RATE: 80 BPM | SYSTOLIC BLOOD PRESSURE: 140 MMHG | DIASTOLIC BLOOD PRESSURE: 90 MMHG | HEIGHT: 73 IN | WEIGHT: 280 LBS | BODY MASS INDEX: 37.11 KG/M2

## 2018-01-27 VITALS
DIASTOLIC BLOOD PRESSURE: 92 MMHG | HEART RATE: 72 BPM | HEIGHT: 73 IN | BODY MASS INDEX: 38.57 KG/M2 | WEIGHT: 291 LBS | SYSTOLIC BLOOD PRESSURE: 140 MMHG

## 2018-01-27 VITALS
HEART RATE: 66 BPM | HEIGHT: 73 IN | SYSTOLIC BLOOD PRESSURE: 110 MMHG | TEMPERATURE: 97.6 F | TEMPERATURE: 97.6 F | WEIGHT: 285 LBS | SYSTOLIC BLOOD PRESSURE: 150 MMHG | HEART RATE: 88 BPM | WEIGHT: 272 LBS | DIASTOLIC BLOOD PRESSURE: 72 MMHG | BODY MASS INDEX: 36.36 KG/M2 | DIASTOLIC BLOOD PRESSURE: 80 MMHG | BODY MASS INDEX: 37.77 KG/M2

## 2018-01-27 VITALS
BODY MASS INDEX: 34.52 KG/M2 | DIASTOLIC BLOOD PRESSURE: 84 MMHG | TEMPERATURE: 98.6 F | HEIGHT: 74 IN | HEART RATE: 68 BPM | WEIGHT: 269 LBS | SYSTOLIC BLOOD PRESSURE: 134 MMHG

## 2018-02-09 VITALS
DIASTOLIC BLOOD PRESSURE: 90 MMHG | HEIGHT: 74 IN | BODY MASS INDEX: 37.04 KG/M2 | WEIGHT: 288.6 LBS | SYSTOLIC BLOOD PRESSURE: 138 MMHG | HEART RATE: 85 BPM | TEMPERATURE: 99 F

## 2018-02-12 NOTE — PROGRESS NOTES
Patient Information     Patient Name MRN Sex Nura Madsen [id number] Male 1980      Progress Notes by Melody Whiting NP at 2018  2:45 PM     Author:  Melody Whiting NP Service:  (none) Author Type:  PHYS- Nurse Practitioner     Filed:  2018  5:14 PM Encounter Date:  2018 Status:  Signed     :  Melody Whiting NP (PHYS- Nurse Practitioner)            HPI:  Nursing Notes:   Tmei Whelan  2018  4:01 PM  Signed  Sore Throat  Onset:     Fever:  yes  Exposure:  yes  Pain scale:  7  Headache:  yes  Associated symptoms:  Congestion, chills, body aches, cough productive clear phlegm.  Temi Whelan LPN .............2018  3:43 PM      Nura Gonzalez is a 37 y.o. male who presents to clinic today for sore throat, cough, congestion, headache, fever today of 103, body aches, trouble sleeping that started two days ago.  Treating symptoms with Ibuprofen. Exposed to strep throat. Denies vomiting, diarrhea, difficulty breathing. Poor appetite, drinking fluids without difficulty.     No past medical history on file.  Past Surgical History:      Procedure  Laterality Date     ESOPHAGOGASTRODUODENOSCOPY  3/5/2014            KNEE ARTHROSCOPY Right      Remvoval of sesamoid bone in foot Left      WISDOM TEETH EXTRACTION       Social History        Substance Use Topics          Smoking status:   Former Smoker      Packs/day:  1.00      Years:  15.00      Quit date:  2013      Smokeless tobacco:   Never Used      Alcohol use   0.0 oz/week     0 Standard drinks or equivalent per week        Comment: RARE       Current Outpatient Prescriptions       Medication  Sig Dispense Refill     allopurinol (ZYLOPRIM) 300 mg tablet Take 1 tablet by mouth once daily. 90 tablet 0     ARIPiprazole (ABILIFY) 10 mg tablet Take 1 tablet by mouth once daily.  0     clonazePAM (KLONOPIN) 1 mg tablet TAKE ONE TABLET BY MOUTH TWICE DAILY 40 tablet 0      "cyclobenzaprine (FLEXERIL) 10 mg tablet Take one tablet by mouth at bedtime for muscle tightness. 30 tablet 0     fenofibrate nanocrystallized (TRICOR) 145 mg tablet Take 1 tablet by mouth once daily with a meal. 90 tablet 0     methylphenidate (CONCERTA) 36 mg Extended-Release tablet Take 1 tablet by mouth once daily. Dx: ADHD  0     omega-3 fatty acids-vitamin E (FISH OIL) 1,000 mg cap 4 DAILY  0     propranolol (INDERAL LA) 120 mg Cs24 Sustained-Release capsule Take 1 capsule by mouth once daily.  0     No current facility-administered medications for this visit.      Medications have been reviewed by me and are current to the best of my knowledge and ability.    No Known Allergies    ROS:  Refer to HPI    /90 (Cuff Site: Left Arm, Position: Sitting, Cuff Size: Adult Large)  Pulse 85  Temp 99  F (37.2  C) (Tympanic)   Ht 1.88 m (6' 2.02\")  Wt 130.9 kg (288 lb 9.6 oz)  BMI 37.04 kg/m2    EXAM:  General Appearance: Ill appearing male, appropriate appearance for age. No acute distress  Ears: Left TM with bony landmarks appreciated with cone of light, no erythema, no effusion, no bulging, no purulence.  Right TM with bony landmarks appreciated with cone of light, no erythema, no effusion, no bulging, no purulence.   Left auditory canal clear, Right auditory canal clear, normal external ears, non tender.  Orophayrnx: moist mucous membranes, posterior pharynx, tonsils 2+ with erythema   Neck: tonsillar adenopathy  Respiratory: normal chest wall and respirations.  Normal effort.  Clear to auscultation bilaterally  Cardiac: RRR   Psychological: normal affect, alert and pleasant    ASSESSMENT/PLAN:    ICD-10-CM    1. Strep throat J02.0 amoxicillin (AMOXIL) 500 mg capsule   2. Sore throat J02.9 RAPID STREP WITH REFLEX CULTURE      RAPID STREP WITH REFLEX CULTURE   Sore throat with cough, congestion  On exam: ill appearing male without fever, lungs clear to ausculation, tonsils with erythema and tonsillar " adenopathy  Results for orders placed or performed in visit on 01/02/18      RAPID STREP WITH REFLEX CULTURE      Result  Value Ref Range    STREP A ANTIGEN           Positive (A) Negative   Treat with Amoxicillin 500 mgs PO BID 10 days  Throw tooth brush away in 2 days  Tylenol or ibuprofen PRN  Follow up if symptoms persist or worsen    Patient Instructions      Index [place]ese Related topics   Sore Throat   ________________________________________________________________________  KEY POINTS    Sore throat is a common symptom that ranges in severity from just a sense of scratchiness to severe pain.    A sore throat caused by a virus infection usually gets better on its own within 5 to 7 days. If it is caused by bacteria, your healthcare provider may prescribe an antibiotic.    Follow the full course of treatment prescribed by your healthcare provider. Ask your healthcare provider how long it will take to recover, and how to take care of yourself at home.  ________________________________________________________________________  What is a sore throat?  Sore throat is a common symptom that ranges in severity from just a sense of scratchiness to severe pain.  What is the cause?  A sore throat can be caused by bacteria (such as strep) or a virus (such as a cold virus). It can also happen when an infection of the airways, sinuses, or mouth spreads to the throat.  Other causes of sore throat include:    Hay fever    Cigarette smoking or secondhand smoke    Breathing heavily polluted air or chemical fumes    Swallowing sharp foods that hurt the lining of the throat, such as a tortilla chip    Dry air    Heartburn (acid reflux)    Irritation of your throat from vomiting    Fluid draining down the back of your throat (postnasal drip)  What are the symptoms?  Symptoms may include:    A raw feeling in the throat that makes breathing, swallowing, or speaking painful    Redness of the throat    Fever    Hoarseness    Pain or  difficulty swallowing because of swollen tonsils    Pus in your throat    Tender, swollen glands (lymph nodes) in your neck    Earache (you may feel pain in your ears even though the problem is in your throat)    Tiredness  How is it diagnosed?  Your healthcare provider will ask about your symptoms and medical history and examine you. Your provider may swab your throat to test for strep infection. If your symptoms are more serious, you may need blood tests to check for signs of infection.  How is it treated?  A sore throat caused by a virus infection usually gets better on its own within 5 to 7 days. If your sore throat is caused by bacteria, your healthcare provider may prescribe an antibiotic. You will feel better after you have taken antibiotics for 2 to 3 days. You must take all of your antibiotic even when you are feeling better. If you don't take all of it, your sore throat could come back.  If another health problem is causing the sore throat, such as hay fever, acid reflux, or sinusitis, treatment for that problem will also treat the sore throat.  How can I take care of myself?  Follow the full course of treatment prescribed by your healthcare provider. In addition:    Take nonprescription pain medicine, such as acetaminophen, ibuprofen, or naproxen. Read the label and take as directed. Unless recommended by your healthcare provider, you should not take these medicines for more than 10 days.    Nonsteroidal anti-inflammatory medicines (NSAIDs), such as ibuprofen, naproxen, and aspirin, may cause stomach bleeding and other problems. These risks increase with age.    Acetaminophen may cause liver damage or other problems. Unless recommended by your provider, don't take more than 3000 milligrams (mg) in 24 hours. To make sure you don t take too much, check other medicines you take to see if they also contain acetaminophen. Ask your provider if you need to avoid drinking alcohol while taking this  medicine.    Don t smoke, and stay away from others who are smoking.    Avoid breathing dust and chemical fumes.    Get plenty of rest.    You may want to rest your throat by talking less and eating a diet that is mostly liquid or soft for a day or two. Avoid salty or spicy foods and citrus fruits. Drink extra fluids, such as water, fruit juice, and tea.    Use a humidifier to put more moisture in the air. Avoid steam vaporizers because they can cause burns. Be sure to keep the humidifier clean, as recommended in the 's instructions. It's important to keep bacteria and mold from growing in the water container.    Cough drops may help relieve the soreness.    Gargling with warm saltwater may help. (You can make a saltwater solution by adding 1/4 teaspoon of salt to 8 ounces, or 240 mL, of warm water.)  If a sore throat lasts for more than a few days, call your healthcare provider. Serious causes of sore throat include strep throat and mononucleosis (mono). It is important to know if one of these is causing your sore throat.  Ask your healthcare provider:    How and when you will get your test results    How long it will take to recover    If there are activities you should avoid and when you can return to your normal activities    How to take care of yourself at home    What symptoms or problems you should watch for and what to do if you have them  Make sure you know when you should come back for a checkup. Keep all appointments for provider visits or tests.  How can I prevent a sore throat?  The following suggestions may help prevent a sore throat:    Wash your hands often and especially after using the restroom, coughing, sneezing, or blowing your nose. Also wash your hands before eating or touching your eyes.    Stay at least 6 feet away from people who are sick, if you can.    Stay indoors as much as possible on high-pollution days.    If you have frequent heartburn or reflux disease, see your  healthcare provider about preventing or treating these problems.    Take care of your health. Try to get at least 7 to 9 hours of sleep each night. Eat a healthy diet and try to keep a healthy weight. If you smoke, try to quit. If you want to drink alcohol, ask your healthcare provider how much is safe for you to drink. Learn ways to manage stress. Stay physically active as advised by your provider.  Developed by [employer].  Adult Advisor 2017.2 published by [employer].  Last modified: 2017-01-26  Last reviewed: 2016-08-30  This content is reviewed periodically and is subject to change as new health information becomes available. The information is intended to inform and educate and is not a replacement for medical evaluation, advice, diagnosis or treatment by a healthcare professional.  References   Adult Advisor 2017.2 Index    Copyright   2017 [employer], a division of [employer] All rights reserved.

## 2018-02-12 NOTE — PATIENT INSTRUCTIONS
Patient Information     Patient Name MRN Nura Shore [id number] Male 1980      Patient Instructions by Melody Whiting NP at 2018  2:45 PM     Author:  Melody Whiting NP Service:  (none) Author Type:  PHYS- Nurse Practitioner     Filed:  2018  4:27 PM Encounter Date:  2018 Status:  Signed     :  Melody Whiting NP (PHYS- Nurse Practitioner)               Index [place] Related topics   Sore Throat   ________________________________________________________________________  KEY POINTS    Sore throat is a common symptom that ranges in severity from just a sense of scratchiness to severe pain.    A sore throat caused by a virus infection usually gets better on its own within 5 to 7 days. If it is caused by bacteria, your healthcare provider may prescribe an antibiotic.    Follow the full course of treatment prescribed by your healthcare provider. Ask your healthcare provider how long it will take to recover, and how to take care of yourself at home.  ________________________________________________________________________  What is a sore throat?  Sore throat is a common symptom that ranges in severity from just a sense of scratchiness to severe pain.  What is the cause?  A sore throat can be caused by bacteria (such as strep) or a virus (such as a cold virus). It can also happen when an infection of the airways, sinuses, or mouth spreads to the throat.  Other causes of sore throat include:    Hay fever    Cigarette smoking or secondhand smoke    Breathing heavily polluted air or chemical fumes    Swallowing sharp foods that hurt the lining of the throat, such as a tortilla chip    Dry air    Heartburn (acid reflux)    Irritation of your throat from vomiting    Fluid draining down the back of your throat (postnasal drip)  What are the symptoms?  Symptoms may include:    A raw feeling in the throat that makes breathing, swallowing, or speaking  painful    Redness of the throat    Fever    Hoarseness    Pain or difficulty swallowing because of swollen tonsils    Pus in your throat    Tender, swollen glands (lymph nodes) in your neck    Earache (you may feel pain in your ears even though the problem is in your throat)    Tiredness  How is it diagnosed?  Your healthcare provider will ask about your symptoms and medical history and examine you. Your provider may swab your throat to test for strep infection. If your symptoms are more serious, you may need blood tests to check for signs of infection.  How is it treated?  A sore throat caused by a virus infection usually gets better on its own within 5 to 7 days. If your sore throat is caused by bacteria, your healthcare provider may prescribe an antibiotic. You will feel better after you have taken antibiotics for 2 to 3 days. You must take all of your antibiotic even when you are feeling better. If you don't take all of it, your sore throat could come back.  If another health problem is causing the sore throat, such as hay fever, acid reflux, or sinusitis, treatment for that problem will also treat the sore throat.  How can I take care of myself?  Follow the full course of treatment prescribed by your healthcare provider. In addition:    Take nonprescription pain medicine, such as acetaminophen, ibuprofen, or naproxen. Read the label and take as directed. Unless recommended by your healthcare provider, you should not take these medicines for more than 10 days.    Nonsteroidal anti-inflammatory medicines (NSAIDs), such as ibuprofen, naproxen, and aspirin, may cause stomach bleeding and other problems. These risks increase with age.    Acetaminophen may cause liver damage or other problems. Unless recommended by your provider, don't take more than 3000 milligrams (mg) in 24 hours. To make sure you don t take too much, check other medicines you take to see if they also contain acetaminophen. Ask your provider if  you need to avoid drinking alcohol while taking this medicine.    Don t smoke, and stay away from others who are smoking.    Avoid breathing dust and chemical fumes.    Get plenty of rest.    You may want to rest your throat by talking less and eating a diet that is mostly liquid or soft for a day or two. Avoid salty or spicy foods and citrus fruits. Drink extra fluids, such as water, fruit juice, and tea.    Use a humidifier to put more moisture in the air. Avoid steam vaporizers because they can cause burns. Be sure to keep the humidifier clean, as recommended in the 's instructions. It's important to keep bacteria and mold from growing in the water container.    Cough drops may help relieve the soreness.    Gargling with warm saltwater may help. (You can make a saltwater solution by adding 1/4 teaspoon of salt to 8 ounces, or 240 mL, of warm water.)  If a sore throat lasts for more than a few days, call your healthcare provider. Serious causes of sore throat include strep throat and mononucleosis (mono). It is important to know if one of these is causing your sore throat.  Ask your healthcare provider:    How and when you will get your test results    How long it will take to recover    If there are activities you should avoid and when you can return to your normal activities    How to take care of yourself at home    What symptoms or problems you should watch for and what to do if you have them  Make sure you know when you should come back for a checkup. Keep all appointments for provider visits or tests.  How can I prevent a sore throat?  The following suggestions may help prevent a sore throat:    Wash your hands often and especially after using the restroom, coughing, sneezing, or blowing your nose. Also wash your hands before eating or touching your eyes.    Stay at least 6 feet away from people who are sick, if you can.    Stay indoors as much as possible on high-pollution days.    If you have  frequent heartburn or reflux disease, see your healthcare provider about preventing or treating these problems.    Take care of your health. Try to get at least 7 to 9 hours of sleep each night. Eat a healthy diet and try to keep a healthy weight. If you smoke, try to quit. If you want to drink alcohol, ask your healthcare provider how much is safe for you to drink. Learn ways to manage stress. Stay physically active as advised by your provider.  Developed by [employer].  Adult Advisor 2017.2 published by [employer].  Last modified: 2017-01-26  Last reviewed: 2016-08-30  This content is reviewed periodically and is subject to change as new health information becomes available. The information is intended to inform and educate and is not a replacement for medical evaluation, advice, diagnosis or treatment by a healthcare professional.  References   Adult Advisor 2017.2 Index    Copyright   2017 [employer], a division of [employer] All rights reserved.

## 2018-02-12 NOTE — NURSING NOTE
Patient Information     Patient Name MRN Sex Nura Madsen [id number] Male 1980      Nursing Note by Temi Whelan at 2018  2:45 PM     Author:  Temi Whelan Service:  (none) Author Type:  NURS- Student Practical Nurse     Filed:  2018  4:01 PM Encounter Date:  2018 Status:  Signed     :  Temi Whelan (NURS- Student Practical Nurse)            Sore Throat  Onset:     Fever:  yes  Exposure:  yes  Pain scale:  7  Headache:  yes  Associated symptoms:  Congestion, chills, body aches, cough productive clear phlegm.  Temi Whelan LPN .............2018  3:43 PM

## 2018-02-15 ENCOUNTER — DOCUMENTATION ONLY (OUTPATIENT)
Dept: FAMILY MEDICINE | Facility: OTHER | Age: 38
End: 2018-02-15

## 2018-02-15 PROBLEM — G44.52 NEW DAILY PERSISTENT HEADACHE: Status: ACTIVE | Noted: 2017-02-09

## 2018-02-15 PROBLEM — I10 HYPERTENSION: Status: ACTIVE | Noted: 2017-10-25

## 2018-02-15 PROBLEM — F98.8 ATTENTION DEFICIT DISORDER (ADD) WITHOUT HYPERACTIVITY: Status: ACTIVE | Noted: 2017-10-25

## 2018-02-15 PROBLEM — G47.33 OSA (OBSTRUCTIVE SLEEP APNEA): Status: ACTIVE | Noted: 2017-10-25

## 2018-02-15 RX ORDER — METHYLPHENIDATE HYDROCHLORIDE 36 MG/1
36 TABLET ORAL DAILY
COMMUNITY
Start: 2016-05-23

## 2018-02-15 RX ORDER — PROPRANOLOL HYDROCHLORIDE 120 MG/1
120 CAPSULE, EXTENDED RELEASE ORAL DAILY
COMMUNITY
Start: 2016-05-23

## 2018-02-15 RX ORDER — FENOFIBRATE 145 MG/1
145 TABLET, COATED ORAL
COMMUNITY
Start: 2017-10-25 | End: 2018-03-13 | Stop reason: DRUGHIGH

## 2018-02-15 RX ORDER — ALLOPURINOL 300 MG/1
300 TABLET ORAL DAILY
COMMUNITY
Start: 2017-10-25 | End: 2018-03-13

## 2018-02-15 RX ORDER — CHLORAL HYDRATE 500 MG
CAPSULE ORAL DAILY
COMMUNITY
Start: 2014-02-17

## 2018-02-15 RX ORDER — CLONAZEPAM 1 MG/1
1 TABLET ORAL 2 TIMES DAILY
COMMUNITY
Start: 2014-03-24

## 2018-02-15 RX ORDER — ARIPIPRAZOLE 10 MG/1
10 TABLET ORAL DAILY
COMMUNITY
Start: 2016-05-23

## 2018-02-15 RX ORDER — CYCLOBENZAPRINE HCL 10 MG
1 TABLET ORAL
COMMUNITY
Start: 2017-10-25 | End: 2018-03-13

## 2018-03-09 ENCOUNTER — MEDICAL CORRESPONDENCE (OUTPATIENT)
Dept: LAB | Facility: OTHER | Age: 38
End: 2018-03-09

## 2018-03-09 DIAGNOSIS — Z79.899 DRUG THERAPY: Primary | ICD-10-CM

## 2018-03-09 LAB
ALBUMIN SERPL-MCNC: 4.5 G/DL (ref 3.5–5.7)
ALP SERPL-CCNC: 73 U/L (ref 34–104)
ALT SERPL W P-5'-P-CCNC: 130 U/L (ref 7–52)
ANION GAP SERPL CALCULATED.3IONS-SCNC: 11 MMOL/L (ref 3–14)
AST SERPL W P-5'-P-CCNC: 61 U/L (ref 13–39)
BASOPHILS # BLD AUTO: 0 10E9/L (ref 0–0.2)
BASOPHILS NFR BLD AUTO: 0.9 %
BILIRUB SERPL-MCNC: 0.5 MG/DL (ref 0.3–1)
BUN SERPL-MCNC: 17 MG/DL (ref 7–25)
CALCIUM SERPL-MCNC: 9.5 MG/DL (ref 8.6–10.3)
CHLORIDE SERPL-SCNC: 102 MMOL/L (ref 98–107)
CHOLEST SERPL-MCNC: 319 MG/DL
CO2 SERPL-SCNC: 25 MMOL/L (ref 21–31)
CREAT SERPL-MCNC: 1.04 MG/DL (ref 0.7–1.3)
DIFFERENTIAL METHOD BLD: NORMAL
EOSINOPHIL # BLD AUTO: 0.1 10E9/L (ref 0–0.7)
EOSINOPHIL NFR BLD AUTO: 1.6 %
ERYTHROCYTE [DISTWIDTH] IN BLOOD BY AUTOMATED COUNT: 14.2 % (ref 10–15)
GFR SERPL CREATININE-BSD FRML MDRD: 80 ML/MIN/1.7M2
GLUCOSE SERPL-MCNC: 175 MG/DL (ref 70–105)
HBA1C MFR BLD: 8.8 % (ref 4–6)
HCT VFR BLD AUTO: 42.5 % (ref 40–53)
HDLC SERPL-MCNC: 26 MG/DL (ref 23–92)
HGB BLD-MCNC: 14.4 G/DL (ref 13.3–17.7)
IMM GRANULOCYTES # BLD: 0.1 10E9/L (ref 0–0.4)
IMM GRANULOCYTES NFR BLD: 1.1 %
LDLC SERPL CALC-MCNC: ABNORMAL MG/DL
LYMPHOCYTES # BLD AUTO: 1.7 10E9/L (ref 0.8–5.3)
LYMPHOCYTES NFR BLD AUTO: 39.5 %
MCH RBC QN AUTO: 27 PG (ref 26.5–33)
MCHC RBC AUTO-ENTMCNC: 33.9 G/DL (ref 31.5–36.5)
MCV RBC AUTO: 80 FL (ref 78–100)
MONOCYTES # BLD AUTO: 0.4 10E9/L (ref 0–1.3)
MONOCYTES NFR BLD AUTO: 10 %
NEUTROPHILS # BLD AUTO: 2.1 10E9/L (ref 1.6–8.3)
NEUTROPHILS NFR BLD AUTO: 46.9 %
NONHDLC SERPL-MCNC: 293 MG/DL
PLATELET # BLD AUTO: 208 10E9/L (ref 150–450)
POTASSIUM SERPL-SCNC: 4 MMOL/L (ref 3.5–5.1)
PROT SERPL-MCNC: 8 G/DL (ref 6.4–8.9)
RBC # BLD AUTO: 5.33 10E12/L (ref 4.4–5.9)
SODIUM SERPL-SCNC: 138 MMOL/L (ref 134–144)
TRIGL SERPL-MCNC: 1470 MG/DL
WBC # BLD AUTO: 4.4 10E9/L (ref 4–11)

## 2018-03-09 PROCEDURE — 85025 COMPLETE CBC W/AUTO DIFF WBC: CPT

## 2018-03-09 PROCEDURE — 80061 LIPID PANEL: CPT

## 2018-03-09 PROCEDURE — 83036 HEMOGLOBIN GLYCOSYLATED A1C: CPT

## 2018-03-09 PROCEDURE — 80053 COMPREHEN METABOLIC PANEL: CPT

## 2018-03-09 PROCEDURE — 36415 COLL VENOUS BLD VENIPUNCTURE: CPT

## 2018-03-13 ENCOUNTER — TELEPHONE (OUTPATIENT)
Dept: FAMILY MEDICINE | Facility: OTHER | Age: 38
End: 2018-03-13

## 2018-03-13 ENCOUNTER — OFFICE VISIT (OUTPATIENT)
Dept: FAMILY MEDICINE | Facility: OTHER | Age: 38
End: 2018-03-13
Attending: FAMILY MEDICINE
Payer: COMMERCIAL

## 2018-03-13 VITALS
WEIGHT: 288.6 LBS | SYSTOLIC BLOOD PRESSURE: 154 MMHG | BODY MASS INDEX: 37.04 KG/M2 | DIASTOLIC BLOOD PRESSURE: 102 MMHG | HEART RATE: 75 BPM | HEIGHT: 74 IN

## 2018-03-13 DIAGNOSIS — M1A.9XX0 CHRONIC GOUT WITHOUT TOPHUS, UNSPECIFIED CAUSE, UNSPECIFIED SITE: ICD-10-CM

## 2018-03-13 DIAGNOSIS — E78.1 HYPERTRIGLYCERIDEMIA: Primary | ICD-10-CM

## 2018-03-13 DIAGNOSIS — I10 ESSENTIAL HYPERTENSION: ICD-10-CM

## 2018-03-13 DIAGNOSIS — E11.9 TYPE 2 DIABETES MELLITUS WITHOUT COMPLICATION, WITHOUT LONG-TERM CURRENT USE OF INSULIN (H): ICD-10-CM

## 2018-03-13 PROBLEM — G44.52 NEW DAILY PERSISTENT HEADACHE: Status: RESOLVED | Noted: 2017-02-09 | Resolved: 2018-03-13

## 2018-03-13 PROCEDURE — 99215 OFFICE O/P EST HI 40 MIN: CPT | Performed by: FAMILY MEDICINE

## 2018-03-13 PROCEDURE — G0463 HOSPITAL OUTPT CLINIC VISIT: HCPCS

## 2018-03-13 RX ORDER — METFORMIN HCL 500 MG
500 TABLET, EXTENDED RELEASE 24 HR ORAL
Qty: 90 TABLET | Refills: 3 | Status: SHIPPED | OUTPATIENT
Start: 2018-03-13

## 2018-03-13 RX ORDER — ALLOPURINOL 300 MG/1
300 TABLET ORAL DAILY
Qty: 90 TABLET | Refills: 3 | Status: SHIPPED | OUTPATIENT
Start: 2018-03-13

## 2018-03-13 RX ORDER — GEMFIBROZIL 600 MG/1
TABLET, FILM COATED ORAL
Qty: 90 TABLET | Refills: 3 | Status: SHIPPED | OUTPATIENT
Start: 2018-03-13

## 2018-03-13 ASSESSMENT — ENCOUNTER SYMPTOMS
APPETITE CHANGE: 0
UNEXPECTED WEIGHT CHANGE: 0
ACTIVITY CHANGE: 0

## 2018-03-13 NOTE — PROGRESS NOTES
SUBJECTIVE:   Nura Gonzalez is a 38 year old male who presents to clinic today for the following health issues:  Had labs for his psychiatrist done at the clinic and was contacted and told to come in due to abnormal lab results.  He has been out of his medication for high triglycerides and just ran out of his gout medication and would like those refilled.  He was found on labs to be a new diabetic and has a very strong family history with his father having type II and his mother prediabetic.  He has been trying to lose weight but cannot exercise very regularly as he has to take care of children in the evenings while his wife works.  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  Meds from Dr. Blas are reviewed.  He has difficulties losing weight on some of his psychiatric meds and I encouraged him to discuss these with Dr. Blas at next encounter.      Blood pressure noted to be high and has been borderline high in the past.  He is on Inderal has been for quite some time since he is to have headaches and that seemed to help control them.  His blood pressure today however is quite high and will need to be followed.  He is planning to get a blood pressure cuff and a prescription for this is given to see if it can be reimbursed.    HPI    Patient Active Problem List   Diagnosis     Anxiety     Asperger syndrome     Attention deficit disorder (ADD) without hyperactivity     Depression, major, in remission (H)     Former smoker     GERD (gastroesophageal reflux disease)     Gout     Hypertension     Hypertriglyceridemia     MONICA (obstructive sleep apnea)     Type 2 diabetes mellitus without complication, without long-term current use of insulin (H)     Class 2 obesity due to excess calories with serious comorbidity in adult     Past Surgical History:   Procedure Laterality Date     ARTHROSCOPY KNEE      No Comments Provided  "    ESOPHAGOSCOPY, GASTROSCOPY, DUODENOSCOPY (EGD), COMBINED      3/5/2014     EXTRACTION(S) DENTAL      No Comments Provided     OTHER SURGICAL HISTORY      597661,OTHER,Left       Social History   Substance Use Topics     Smoking status: Former Smoker     Packs/day: 1.00     Years: 15.00     Quit date: 8/17/2013     Smokeless tobacco: Never Used     Alcohol use 0.0 oz/week      Comment: Alcoholic Drinks/day: RARE     Family History   Problem Relation Age of Onset     Hyperlipidemia Mother      Hyperlipidemia     Hypertension Mother      Hypertension     Other - See Comments Mother      Psychiatric illness,Depression     Back Pain Mother      Pre-Diabetes Mother      Diabetic Kidney Disease Father      DIABETES Father      DIABETES Brother      Other - See Comments Brother      Tourette's     DIABETES Other      Diabetes,Nephew-type 1     DIABETES Maternal Grandmother          Current Outpatient Prescriptions   Medication Sig Dispense Refill     gemfibrozil (LOPID) 600 MG tablet Take one tablet daily before supper 90 tablet 3     metFORMIN (GLUCOPHAGE-XR) 500 MG 24 hr tablet Take 1 tablet (500 mg) by mouth daily (with dinner) 90 tablet 3     allopurinol (ZYLOPRIM) 300 MG tablet Take 1 tablet (300 mg) by mouth daily 90 tablet 3     Blood Pressure Monitor KIT 1 Device 2 times daily as needed 1 kit 0     ARIPiprazole (ABILIFY) 10 MG tablet Take 10 mg by mouth daily       clonazePAM (KLONOPIN) 1 MG tablet Take 1 mg by mouth 2 times daily       methylphenidate ER (CONCERTA) 36 MG CR tablet Take 36 mg by mouth daily       fish oil-omega-3 fatty acids 1000 MG capsule daily       propranolol (INDERAL LA) 120 MG 24 hr capsule Take 120 mg by mouth daily       No Known Allergies    Review of Systems   Constitutional: Negative for activity change, appetite change and unexpected weight change.   Genitourinary: Negative.         OBJECTIVE:     BP (!) 154/102  Pulse 75  Ht 6' 2\" (1.88 m)  Wt 288 lb 9.6 oz (130.9 kg)  BMI " "37.05 kg/m2  Body mass index is 37.05 kg/(m^2).   Wt Readings from Last 3 Encounters:   03/13/18 288 lb 9.6 oz (130.9 kg)   01/02/18 288 lb 9.6 oz (130.9 kg)   10/25/17 291 lb (132 kg)       Physical Exam   Constitutional: He appears well-developed. No distress.   Abdominal: Soft.   LARGE abdominal girth.    Nursing note and vitals reviewed.      Diagnostic Test Results:  Labs reviewed and given to patient.     ASSESSMENT/PLAN:     BMI:   Estimated body mass index is 37.05 kg/(m^2) as calculated from the following:    Height as of this encounter: 6' 2\" (1.88 m).    Weight as of this encounter: 288 lb 9.6 oz (130.9 kg).   Weight management plan: dietician with diabetic ed         ICD-10-CM    1. Hypertriglyceridemia E78.1 gemfibrozil (LOPID) 600 MG tablet     Blood Pressure Monitor KIT   2. Type 2 diabetes mellitus without complication, without long-term current use of insulin (H) E11.9 DIABETES EDUCATOR REFERRAL     metFORMIN (GLUCOPHAGE-XR) 500 MG 24 hr tablet   3. Chronic gout without tophus, unspecified cause, unspecified site [id number] allopurinol (ZYLOPRIM) 300 MG tablet   4. Essential hypertension I10    Plan:  Diabetic goals of treatment and plan reviewed from AVS.  Blood pressure noted to be elevated on recheck is still elevated today.  He is not had significant elevation on Inderal.  Will need to consider addition of another medication if this does not improve.  Given prescription to see if we can get a blood pressure cuff for home use.  Referred to diabetic add and will try to get that set up so he can get a glucometer and education going.  Discussed diet and his carbohydrate intake.  He does not eat breakfast and usually eats most of his calories at the p.m. meal.  He denies significant snacking.  Denies alcohol use or abuse.  Sedentary lifestyle discussed and he is not exercising and knows that 1 of his biggest blocks to health and wellness.  Refilled allopurinol and given gemfibrozil for triglycerides.  " Started on metformin low-dose until we get him started on dietary management as well.  Next diabetic check would be in 3-4 months once he is seen by diabetic ed.  We will encouraged them to initiate follow-up.  Angela Perez MD  3:08 PM 3/13/2018   I spent approximately 40  minutes with the patient (exclusive of separately billed services/procedures), with greater than 50% spent in Counseling,Prognosis, Risks and benefits of management or follow-up, Importance of compliance with chosen management options, Instructions of management (treatment) and/or follow-up, Risk factor reduction and Patient education andCoordinating Care, Establishing and/or reviewing the patient's medical record.    Portions of this dictation were created using the Dragon Nuance voice recognition system. Proofreading was completed but there may be errors in text.      Angela Perez MD  [facility]

## 2018-03-13 NOTE — PATIENT INSTRUCTIONS
Goals for Your Diabetes Care  A1c   Goal:  Less than 7 percent ask your doctor if this is right for you  Check it: Every 3 to 6 months  Why:  Shows how well your blood glucose was controlled over the past 3 months  Blood pressure   Goal: Under 140/90  Check it:  At every clinic check up for diabetes  Why:  May prevent stroke, heart disease and eye and kidney diseases  Cholesterol   Goal:  Discuss cholesterol management with your doctor, and consider taking a statin medicine  Check it:  Every year  Why:  Helps prevent heart attacks and stroke  Kidney test (urine microalbumin)  Goal:  Under 30  Do it:  Every year  Why:  Finds kidney problems before they get worse  Foot exam   Goal:  No cuts or sores, normal sensation  Do it: Remove shoes and socks at every visit; have a monofilament test every year  Why: Finds foot problems before they get worse  Eye exam   Goal:  No changes in your eyes  Do it: Every year  Why:  Finds eye problems before they get worse  Exercise  Goal:  150 minutes of aerobic exercises and 2 to 3 sessions of strength training  Do it: Every week  Why:  Helps manage diabetes and improve health  Aspirin  Goal:  If you are age 50 or older, ask your doctor if you should take aspirin  Take it: Every day, or as prescribed  Why: Lowers the risk of heart attack and stroke  Smoking and tobacco  Goal: No tobacco use  Why: Tobacco is a major risk for heart disease  Diabetes education  Goal: Learn how to manage your diabetes  Do it: At least once a year ask your doctor for a referral  Why: The more you know, the better you can manage your diabetes  Your goals may be different from what you see here. Your care team will tell you what your goals should be.   For informational purposes only. Not to replace the advice of your health care provider.   Copyright   2009 [employer]. All rights reserved. "[employer]" 786442   Rev 01/16.

## 2018-03-13 NOTE — TELEPHONE ENCOUNTER
Patient had labs done per Dr. Blas and was told to f/u on the results.  Yvonne Mi LPN ...... 3/13/2018 9:59 AM

## 2018-03-13 NOTE — TELEPHONE ENCOUNTER
Patient has labs yesterday and was told he needed to follow up immediately with his doctor due to the lad results. He was wondering if he could be worked in today?

## 2018-03-13 NOTE — NURSING NOTE
Patient presents to clinic to f/u on lab work ordered by Dr. Blas.  Yvonne Mi LPN ...... 3/13/2018 2:11 PM

## 2018-03-13 NOTE — MR AVS SNAPSHOT
After Visit Summary   3/13/2018    Nura Gonzalez    MRN: [id number]           Patient Information     Date Of Birth          1980        Visit Information        Provider Department      3/13/2018 2:15 PM nAgela Perez MD [facility]        Today's Diagnoses     Hypertriglyceridemia    -  1    Type 2 diabetes mellitus without complication, without long-term current use of insulin (H)          Care Instructions    Goals for Your Diabetes Care  A1c   Goal:  Less than 7 percent--ask your doctor if this is right for you  Check it: Every 3 to 6 months  Why:  Shows how well your blood glucose was controlled over the past 3 months  Blood pressure   Goal: Under 140/90  Check it:  At every clinic check up for diabetes  Why:  May prevent stroke, heart disease and eye and kidney diseases  Cholesterol   Goal:  Discuss cholesterol management with your doctor, and consider taking a statin medicine  Check it:  Every year  Why:  Helps prevent heart attacks and stroke  Kidney test (urine microalbumin)  Goal:  Under 30  Do it:  Every year  Why:  Finds kidney problems before they get worse  Foot exam   Goal:  No cuts or sores, normal sensation  Do it: Remove shoes and socks at every visit; have a monofilament test every year  Why: Finds foot problems before they get worse  Eye exam   Goal:  No changes in your eyes  Do it: Every year  Why:  Finds eye problems before they get worse  Exercise  Goal:  150 minutes of aerobic exercises and 2 to 3 sessions of strength training  Do it: Every week  Why:  Helps manage diabetes and improve health  Aspirin  Goal:  If you are age 50 or older, ask your doctor if you should take aspirin  Take it: Every day, or as prescribed  Why: Lowers the risk of heart attack and stroke  Smoking and tobacco  Goal: No tobacco use  Why: Tobacco is a major risk for heart disease  Diabetes education  Goal: Learn how to manage your diabetes  Do it: At least once a year--ask  your doctor for a referral  Why: The more you know, the better you can manage your diabetes  Your goals may be different from what you see here. Your care team will tell you what your goals should be.   For informational purposes only. Not to replace the advice of your health care provider.   Copyright   2009 [facility]. All rights reserved. [employer] 830354 - Rev 01/16.            Follow-ups after your visit        Additional Services     DIABETES EDUCATOR REFERRAL       DIABETES SELF MANAGEMENT TRAINING (DSMT)      Your provider has referred you to Diabetes Education: VIN    A  will call you to make your appointment. If it has been more than 3 business days since your referral was placed, please call the above phone number to schedule.    Type of training and number of hours: New Diagnosis: Initial group DSMT - 10 hours.      Medicare covers: 10 hours of initial DSMT in 12 month period from the time of first visit, plus 2 hours of follow-up DSMT annually, and additional hours as requested for insulin training.    Diabetes Type: Type 2 - On Oral Medication             Diabetes Co-Morbidities: dyslipidemia and hypertension               A1C Goal:  <7.0       A1C is: Lab Results       Component                Value               Date                       A1C                      8.8                 03/09/2018              Diabetes Education Topics: Comprehensive Knowledge Assessment and Instruction, Knowledge: Healthy Eating, Being Active and Monitoring Blood Sugar and Blood glucose meter instruction     Special Educational Needs Requiring Individual DSMT: Other: Mild autism, psych issues       MEDICAL NUTRITION THERAPY (MNT) for Diabetes    Medical Nutrition Therapy with a Registered Dietitian can be provided in coordination with Diabetes Self-Management Training to assist in achieving optimal diabetes management.    MNT Type and Hours: Do not initiate MNT at this time.                      "  Medicare will cover: 3 hours initial MNT in 12 month period after first visit, plus 2 hours of follow-up MNT annually    Please be aware that coverage of these services is subject to the terms and limitations of your health insurance plan.  Call member services at your health plan to determine Diabetes Self-Management Training (Codes  &amp; ) and Medical Nutrition Therapy (Codes 61057 & 97575) benefits and ask which blood glucose monitor brands are covered by your plan.  Please bring the following with you to your appointment:    (1)  List of current medications   (2)  List of Blood Glucose Monitor brands that are covered by your insurance plan  (3)  Blood Glucose Monitor and log book  (4)   Food records for the 3 days prior to your visit    The Certified Diabetes Educator may make diabetes medication adjustments per the CDE Protocol and Collaborative Practice Agreement.                  Who to contact     If you have questions or need follow up information about today's clinic visit or your schedule please contact [facility] directly at [phone].  Normal or non-critical lab and imaging results will be communicated to you by [employer], letter or phone within 4 business days after the clinic has received the results. If you do not hear from us within 7 days, please contact the clinic through [employer] or phone. If you have a critical or abnormal lab result, we will notify you by phone as soon as possible.  Submit refill requests through [employer] or call your pharmacy and they will forward the refill request to us. Please allow 3 business days for your refill to be completed.          Additional Information About Your Visit        [employer] Information     [employer] lets you send messages to your doctor, view your test results, renew your prescriptions, schedule appointments and more. To sign up, go to [email or web address] . Click on \"Log in\" on the left side of the screen, which will " "take you to the Welcome page. Then click on \"Sign up Now\" on the right side of the page.     You will be asked to enter the access code listed below, as well as some personal information. Please follow the directions to create your username and password.     Your access code is: [email or web address]  Expires: 2018  2:35 PM     Your access code will  in 90 days. If you need help or a new code, please call your [facility] clinic or [phone].        Care EveryWhere ID     This is your Care EveryWhere ID. This could be used by other organizations to access your [facility] medical records  [id number]        Your Vitals Were     Pulse Height BMI (Body Mass Index)             72 6' 2\" (1.88 m) 37.05 kg/m2          Blood Pressure from Last 3 Encounters:   18 (!) 158/120   18 138/90   10/25/17 (!) 140/92    Weight from Last 3 Encounters:   18 288 lb 9.6 oz (130.9 kg)   18 288 lb 9.6 oz (130.9 kg)   10/25/17 291 lb (132 kg)              We Performed the Following     DIABETES EDUCATOR REFERRAL          Today's Medication Changes          These changes are accurate as of 3/13/18  2:35 PM.  If you have any questions, ask your nurse or doctor.               Start taking these medicines.        Dose/Directions    gemfibrozil 600 MG tablet   Commonly known as:  LOPID   Used for:  Hypertriglyceridemia   Started by:  Agnela Perez MD        Take one tablet daily before supper   Quantity:  90 tablet   Refills:  3       metFORMIN 500 MG 24 hr tablet   Commonly known as:  GLUCOPHAGE-XR   Used for:  Type 2 diabetes mellitus without complication, without long-term current use of insulin (H)   Started by:  Angela Perez MD        Dose:  500 mg   Take 1 tablet (500 mg) by mouth daily (with dinner)   Quantity:  90 tablet   Refills:  3         Stop taking these medicines if you haven't already. Please contact your care team if you have questions.     fenofibrate 145 MG tablet   Stopped by:  Ana" Angela Mckeon MD                Where to get your medicines      These medications were sent to [id number] - [place], [place] - [address]  [address] [facility] [[email or web address]], [facility] [address]     Phone:  [phone]     gemfibrozil 600 MG tablet    metFORMIN 500 MG 24 hr tablet                Primary Care Provider Office Phone # Fax #    Angela Perez [phone]       [address] [facility] [address]        Equal Access to Services     [facility] HospitalYVETTE : Hadii aad ku hadasho Soomaali, waaxda luqadaha, qaybta kaalmada adeegyada, waxay idiin hayaan adedu kharakhalif love . So [facility] [phone].    ATENCIÓN: Si habla español, tiene a aguirre disposición servicios gratuitos de asistencia lingüística. [facility] [phone].    We comply with applicable federal civil rights laws and [place] laws. We do not discriminate on the basis of race, color, national origin, age, disability, sex, sexual orientation, or gender identity.            Thank you!     Thank you for choosing [facility]  for your care. Our goal is always to provide you with excellent care. Hearing back from our patients is one way we can continue to improve our services. Please take a few minutes to complete the written survey that you may receive in the mail after your visit with us. Thank you!             Your Updated Medication List - Protect others around you: Learn how to safely use, store and throw away your medicines at www.disposemymeds.org.          This list is accurate as of 3/13/18  2:35 PM.  Always use your most recent med list.                   Brand Name Dispense Instructions for use Diagnosis    allopurinol 300 MG tablet    ZYLOPRIM     Take 300 mg by mouth daily        ARIPiprazole 10 MG tablet    ABILIFY     Take 10 mg by mouth daily        clonazePAM 1 MG tablet    klonoPIN     Take 1 mg by mouth 2 times daily        fish oil-omega-3 fatty acids 1000 MG capsule      daily         gemfibrozil 600 MG tablet    LOPID    90 tablet    Take one tablet daily before supper    Hypertriglyceridemia       metFORMIN 500 MG 24 hr tablet    GLUCOPHAGE-XR    90 tablet    Take 1 tablet (500 mg) by mouth daily (with dinner)    Type 2 diabetes mellitus without complication, without long-term current use of insulin (H)       methylphenidate ER 36 MG CR tablet    CONCERTA     Take 36 mg by mouth daily        propranolol 120 MG 24 hr capsule    INDERAL LA     Take 120 mg by mouth daily

## 2018-03-27 ENCOUNTER — ALLIED HEALTH/NURSE VISIT (OUTPATIENT)
Dept: EDUCATION SERVICES | Facility: OTHER | Age: 38
End: 2018-03-27
Attending: FAMILY MEDICINE
Payer: COMMERCIAL

## 2018-03-27 DIAGNOSIS — E11.9 TYPE 2 DIABETES MELLITUS WITHOUT COMPLICATION, WITHOUT LONG-TERM CURRENT USE OF INSULIN (H): Primary | ICD-10-CM

## 2018-03-27 DIAGNOSIS — E11.9 DIABETES MELLITUS WITHOUT COMPLICATION (H): ICD-10-CM

## 2018-03-27 PROCEDURE — G0108 DIAB MANAGE TRN  PER INDIV: HCPCS | Performed by: REGISTERED NURSE

## 2018-03-27 PROCEDURE — G0108 DIAB MANAGE TRN  PER INDIV: HCPCS

## 2018-03-27 RX ORDER — LANCETS
EACH MISCELLANEOUS
Qty: 204 EACH | Refills: 3 | Status: SHIPPED | OUTPATIENT
Start: 2018-03-27

## 2018-03-27 NOTE — PROGRESS NOTES
"  Diabetes Self Management Training: Initial Assessment Visit for Newly Diagnosed Patients (Complete AADE Goals Flowsheet)    Nura Gonzalez presents today for education related to Type 2 diabetes.    He is accompanied by self    Patient's diabetes management related comments/concerns:  How to lower the A1c.    Patient's emotional response to diabetes: expresses readiness to learn, concern for health and well-being and confidence diabetes can be controlled    Patient would like this visit to be focused around the following diabetes-related behaviors and goals: Diabetes pathophysiology, Assistance with making lifestyle changes, Self-care behavioral goal setting, Healthy Eating, Being Active and Monitoring    ASSESSMENT:  Patient Problem List and Family Medical History reviewed for relevant medical history, current medical status, and diabetes risk factors.    Current Diabetes Management per Patient:  Taking diabetes medications?   yes:     Diabetes Medication(s)     Biguanides Sig    metFORMIN (GLUCOPHAGE-XR) 500 MG 24 hr tablet Take 1 tablet (500 mg) by mouth daily (with dinner)           *Abbreviated insulin dose documentation key: Insulin trade name (esovnihmc-iaiia-ilmoig-bedtime) - i.e. Humalog 5-5-5-0 (Humalog 5 units at breakfast, 5 units at lunch, and 5 units at dinner).     Patient glucose self monitoring as follows: two times daily.   BG meter: older Accu-Chek Nathalie meter given to him by his family.  BG results: fasting glucose- Today 152 mg/dl and today 3-hr  mg/dl.     BG values:  FBG elevated, within target after meals.  Patient's most recent   Lab Results   Component Value Date    A1C 8.8 03/09/2018    is not meeting goal of <7.0    Nutrition:  Patient has a low intake of carbohydrates, states, \"I stopped drinking my daily pop with lunch and have tried to cut out all carb.  I eat a lot of vegetables.\"    Recommend carbohydrate counting, 4 choices per meal, 0 - 1 choice per snack (limiting snacks " "to help with weight loss and tighter BG control).    Beverages: Unsweetened Black Tea and Water 8 - 10 glasses daily.    Cultural/Mosque diet restrictions: No     Biggest Challenge to Healthy Eating: knowing what to eat    Patient personal goal:  Count carbohydrates at most of my meals and snacks.     Physical Activity:    Type: limited due to caring for his toddler in the evenings.    Patient personal goal:  Increase my physical activity at least 7 days a week.    Diabetes Risk Factors:  family history and inactivity    Diabetes Complications:  Chronic Complication Prevention:  Patient newly diagnosed DM2.  Discussed pathophysiology with interpretation and meaning of HgA1c.  Stressed importance of testing BG daily to help keep tight control of DM2, helping to minimize risk for possible complications of uncontrolled diabetes.  Discussed benefits of keeping A1c under 7% and encouraged patient to continue testing BG daily.     Vitals:  There were no vitals taken for this visit.  Estimated body mass index is 37.05 kg/(m^2) as calculated from the following:    Height as of 3/13/18: 1.88 m (6' 2\").    Weight as of 3/13/18: 130.9 kg (288 lb 9.6 oz).   Last 3 BP:   BP Readings from Last 3 Encounters:   03/13/18 (!) 154/102   01/02/18 138/90   10/25/17 (!) 140/92       History   Smoking Status     Former Smoker     Packs/day: 1.00     Years: 15.00     Quit date: 8/17/2013   Smokeless Tobacco     Never Used       Labs:  Lab Results   Component Value Date    A1C 8.8 03/09/2018     Lab Results   Component Value Date     03/09/2018     Lab Results   Component Value Date    LDL  03/09/2018     Cannot estimate LDL when triglyceride exceeds 400 mg/dL     HDL Cholesterol   Date Value Ref Range Status   03/09/2018 26 23 - 92 mg/dL Final   ]  GFR Estimate   Date Value Ref Range Status   03/09/2018 80 >60 mL/min/1.7m2 Final     GFR Estimate If Black   Date Value Ref Range Status   03/09/2018 >90 >60 mL/min/1.7m2 Final "     Lab Results   Component Value Date    CR 1.04 03/09/2018     No results found for: MICROALBUMIN    Socio/Economic Considerations:    Support system: family and spouse/significant other    Health Beliefs and Attitudes:   Patient Activation Measure Survey Score:  No flowsheet data found.    Stage of Change: ACTION (Actively working towards change)      Diabetes knowledge and skills assessment:     Patient is knowledgeable in diabetes management concepts related to: Monitoring    Patient needs further education on the following diabetes management concepts: Healthy Eating, Being Active and Problem Solving    Barriers to Learning Assessment: No Barriers identified    Based on learning assessment above, most appropriate setting for further diabetes education would be: Group class or Individual setting.    INTERVENTION:   Education provided today on:  AADE Self-Care Behaviors:  Healthy Eating: carbohydrate counting, consistency in amount, composition, and timing of food intake and label reading  Being Active: relationship to blood glucose and describe appropriate activity program  Monitoring: purpose, proper technique, log and interpret results, individual blood glucose targets and frequency of monitoring  Taking Medication: action of prescribed medication and side effects of prescribed medications  Reducing Risks: major complications of diabetes, prevention, early diagnostic measures and treatment of complications, A1C - goals, relating to blood glucose levels, how often to check and blood pressure and goals  Patient was instructed on Accu-Chek Guide meter and was able to provide an accurate return demonstration. Patient's blood glucose reading today was 109 mg/dL.    Opportunities for ongoing education and support in diabetes-self management were discussed.    Pt verbalized understanding of concepts discussed and recommendations provided today.       Education Materials Provided:  My Food Plan, Activity Pyramid, A1c  ian, Tips on SMBG, Diabetes Success Plan, DSMS sheet and New T2DM folder with Diabetes Self-Management magazine.     PLAN:  See Patient Instructions for co-developed, patient-stated behavior change goals.  Meal Plan Recommendation: eat 3 meals a day; Recommend carbohydrate counting, 4 choices per meal, 0 - 1 choice per snack (limiting snacks to help with weight loss and tighter BG control).  Exercise / activity plan:  Recommend low to moderate exercise, such as walking, working up to a minimum of 30 minutes most days, as tolerated.   Check blood sugars fasting and alternate times before or 2 hours after a meal.  Keep a blood glucose record for next visit.  AVS printed and provided to patient today.    FOLLOW-UP:  Follow-up with PCP recommended.  Encouraged to schedule for diabetes education group classes    Ongoing plan for education and support: Follow-up visit with diabetes educator in 2 - 4 weeks, or as needed.    Time Spent: 60 minutes  Encounter Type: Individual    Flor Alas RN, BSN, CDE  3/27/2018 5:12 PM

## 2018-03-27 NOTE — MR AVS SNAPSHOT
After Visit Summary   3/27/2018    Nura Gonzalez    MRN: [id number]           Patient Information     Date Of Birth          1980        Visit Information        Provider Department      3/27/2018 3:00 PM  DIABETES EDUCATION [facility]        Today's Diagnoses     Type 2 diabetes mellitus without complication, without long-term current use of insulin (H)    -  1      Care Instructions    Diabetes Goals and Reminders  Your A1c test should be done every 3 months.  Your goal is less than 7%.   Your last result is: 8.8%, 3/9/2018      Your LDL cholesterol test should be done at least once a year.    Your last result is:  No components found for: LDLCHOL    Have Blood pressure and weight checked every three months.  Your blood pressure goal is 140/90 or less.  Your last blood pressure reading was: 154/102    Test your blood sugar 2 times per day.  Your home blood glucose target rangesare:  Fasting or Before meals:   2 hours after a meal: Less than 180      Avoid all tobacco.  Follow your healthy diet and exercise plan.  See the eye doctor every year.  See the dentist every sixmonths.  Have kidney function tested yearly.    Take all medicine as prescribed.  Please let me know if you are having symptoms you don t expect or if you wish to stop anymedicine.    Follow Up Plan  Please call or visit Diabetes Education if blood sugars are consistently out of target.  Your future lab plan is:  HgA1c in June 2018.    If you need your cholesterol checked at your nextappointment, you should fast 8 to 10 hours before your appointment.  Do not eat or drink anything besides water.  Drink plenty of water and take all your usual medicine.    SUMMARY FOR TODAY'S VISIT    1.  Continue testing blood sugar 2 time(s) daily, as directed.    2.  Begin carbohydrate counting, 4 choices per meal, 0 - 1 choice per snack (limiting snacks to help with weight loss and tighter blood sugar control).  "    3.  Recommend low tomoderate exercise, such as walking, working up to a minimum of 30 minutes most days, as tolerated.     4.  Follow-up for continued diabetes education, as needed.  Consider attending the Diabetes BASICS class,  of each month, from 9am - 11am, at the [facility].  If you would like to attend, please call [phone] or [phone].      Flor Alas RN, BSN, CDE  3/27/2018 4:28 PM                  Follow-ups after your visit        Who to contact     If you have questions or need follow up information about today's clinic visit or your schedule please contact [facility] directly at [phone].  Normal or non-critical lab and imaging results will be communicated to you by [employer], letter or phone within 4 business days after the clinic has received the results. If you do not hear from us within 7 days, please contact the clinic through [employer] or phone. If you have a critical or abnormal lab result, we will notify you by phone as soon as possible.  Submit refill requests through [employer] or call your pharmacy and they will forward the refill request to us. Please allow 3 business days for your refill to be completed.          Additional Information About Your Visit        [employer] Information     [employer] lets you send messages to your doctor, view your test results, renew your prescriptions, schedule appointments and more. To sign up, go to [email or web address] . Click on \"Log in\" on the left side of the screen, which will take you to the Welcome page. Then click on \"Sign up Now\" on the right side of the page.     You will be asked to enter the access code listed below, as well as some personal information. Please follow the directions to create your username and password.     Your access code is: [email or web address]  Expires: 2018  2:35 PM     Your access code will  in 90 days. If you need help or a new code, please call your [place] clinic or " [phone].        Care EveryWhere ID     This is your Care EveryWhere ID. This could be used by other organizations to access your [place] medical records  [id number]         Blood Pressure from Last 3 Encounters:   03/13/18 (!) 154/102   01/02/18 138/90   10/25/17 (!) 140/92    Weight from Last 3 Encounters:   03/13/18 130.9 kg (288 lb 9.6 oz)   01/02/18 130.9 kg (288 lb 9.6 oz)   10/25/17 132 kg (291 lb)              Today, you had the following     No orders found for display         Today's Medication Changes          These changes are accurate as of 3/27/18  4:33 PM.  If you have any questions, ask your nurse or doctor.               Start taking these medicines.        Dose/Directions    blood glucose monitoring lancets   Used for:  Type 2 diabetes mellitus without complication, without long-term current use of insulin (H)        Use to test blood sugar 2 times daily or as directed.   Quantity:  204 each   Refills:  3       blood glucose monitoring test strip   Commonly known as:  ACCU-CHEK GUIDE   Used for:  Type 2 diabetes mellitus without complication, without long-term current use of insulin (H)        Use to test blood sugar 2 times daily or as directed.   Quantity:  200 strip   Refills:  3            Where to get your medicines      These medications were sent to [facility] [address] [place] [address]     Phone:  [phone]     blood glucose monitoring lancets    blood glucose monitoring test strip                Primary Care Provider Office Phone # Fax #    Angela Linda Perez [phone]       [address] [facility] [address]        Equal Access to Services     TOM YE : Miquel Dawkins, vira hauser, marychuy sorto. So [facility] [phone].    ATENCIÓN: Si habla español, tiene a aguirre disposición servicios gratuitos de  marina lingüística. Denzel al [phone].    We comply with applicable federal civil rights laws and [place] laws. We do not discriminate on the basis of race, color, national origin, age, disability, sex, sexual orientation, or gender identity.            Thank you!     Thank you for choosing [facility]  for your care. Our goal is always to provide you with excellent care. Hearing back from our patients is one way we can continue to improve our services. Please take a few minutes to complete the written survey that you may receive in the mail after your visit with us. Thank you!             Your Updated Medication List - Protect others around you: Learn how to safely use, store and throw away your medicines at www.disposemymeds.org.          This list is accurate as of 3/27/18  4:33 PM.  Always use your most recent med list.                   Brand Name Dispense Instructions for use Diagnosis    allopurinol 300 MG tablet    ZYLOPRIM    90 tablet    Take 1 tablet (300 mg) by mouth daily    Chronic gout without tophus, unspecified cause, unspecified site       ARIPiprazole 10 MG tablet    ABILIFY     Take 10 mg by mouth daily        blood glucose monitoring lancets     204 each    Use to test blood sugar 2 times daily or as directed.    Type 2 diabetes mellitus without complication, without long-term current use of insulin (H)       blood glucose monitoring test strip    ACCU-CHEK GUIDE    200 strip    Use to test blood sugar 2 times daily or as directed.    Type 2 diabetes mellitus without complication, without long-term current use of insulin (H)       Blood Pressure Monitor Kit     1 kit    1 Device 2 times daily as needed    Hypertriglyceridemia       clonazePAM 1 MG tablet    klonoPIN     Take 1 mg by mouth 2 times daily        fish oil-omega-3 fatty acids 1000 MG capsule      daily        gemfibrozil 600 MG tablet    LOPID    90 tablet    Take one tablet daily before supper     Hypertriglyceridemia       metFORMIN 500 MG 24 hr tablet    GLUCOPHAGE-XR    90 tablet    Take 1 tablet (500 mg) by mouth daily (with dinner)    Type 2 diabetes mellitus without complication, without long-term current use of insulin (H)       methylphenidate ER 36 MG CR tablet    CONCERTA     Take 36 mg by mouth daily        propranolol 120 MG 24 hr capsule    INDERAL LA     Take 120 mg by mouth daily

## 2018-03-27 NOTE — PATIENT INSTRUCTIONS
Diabetes Goals and Reminders  Your A1c test should be done every 3 months.  Your goal is less than 7%.   Your last result is: 8.8%, 3/9/2018      Your LDL cholesterol test should be done at least once a year.    Your last result is:  No components found for: LDLCHOL    Have Blood pressure and weight checked every three months.  Your blood pressure goal is 140/90 or less.  Your last blood pressure reading was: 154/102    Test your blood sugar 2 times per day.  Your home blood glucose target rangesare:  Fasting or Before meals:   2 hours after a meal: Less than 180      Avoid all tobacco.  Follow your healthy diet and exercise plan.  See the eye doctor every year.  See the dentist every sixmonths.  Have kidney function tested yearly.    Take all medicine as prescribed.  Please let me know if you are having symptoms you don t expect or if you wish to stop anymedicine.    Follow Up Plan  Please call or visit Diabetes Education if blood sugars are consistently out of target.  Your future lab plan is:  HgA1c in June 2018.    If you need your cholesterol checked at your nextappointment, you should fast 8 to 10 hours before your appointment.  Do not eat or drink anything besides water.  Drink plenty of water and take all your usual medicine.    SUMMARY FOR TODAY'S VISIT    1.  Continue testing blood sugar 2 time(s) daily, as directed.    2.  Begin carbohydrate counting, 4 choices per meal, 0 - 1 choice per snack (limiting snacks to help with weight loss and tighter blood sugar control).     3.  Recommend low tomoderate exercise, such as walking, working up to a minimum of 30 minutes most days, as tolerated.     4.  Follow-up for continued diabetes education, as needed.  Consider attending the Diabetes BASICS class, 4th Wednesday of each month, from 9am - 11am, at the [facility].  If you would like to attend, please call [phone] or [phone].      Flor Alas RN, BSN, CDE  3/27/2018 4:28 PM

## 2018-07-23 NOTE — PROGRESS NOTES
Patient Information     Patient Name  Nura Gonzalez MRN  [id number] Sex  Male   1980      Letter by Angela Perez MD at      Author:  Angela Perez MD Service:  (none) Author Type:  (none)    Filed:   Encounter Date:  7/10/2017 Status:  (Other)           Nura Gonzalez  [address]  [facility] [place] [address]          July 10, 2017    Dear Mr. Gonzalez:    This letter is to remind you that you are due for your annual exam with Angela Perez MD. Your last comprehensive visit was more than 12 months ago.    A LIMITED refill of         allopurinol (ZYLOPRIM) 300 mg tablet      fenofibrate nanocrystallized (TRICOR) 145 mg tablet     has been called into your pharmacy. Additional refills require you to complete this appointment.    Please call the clinic at [phone] to schedule your appointment.    If you should require additional refills before your scheduled appointment, please contact your pharmacy and we will refill your medication until the date of your appointment.    If you are no longer seeing Angela Perez MD for primary care, please call to let us know. Doing so will remove you from our call/contact list.      Thank you for choosing [facility] for your health care needs.    Sincerely,    Refill RN  [facility]

## 2018-07-23 NOTE — PROGRESS NOTES
Patient Information     Patient Name  Nura Gonzalez MRN  [id number] Sex  Male   1980      Letter by Tariq Luna NP at      Author:  Tariq Luna NP Service:  (none) Author Type:  (none)    Filed:   Encounter Date:  3/12/2017 Status:  (Other)             Work/School Excuse and Restrictions                    Discharged:                                                              3:28 PM  3/12/2017        Nura Gonzalez  was seen today in the [facility] for illness.      Nura is unable to work on Monday 3/13/17 due to illness.    Nura is able to return to work onTuesday 3/14/17.            If you have any questions regarding the validity of this note please call the number above.       *As an acute care facility, we do not provide follow-up care and are therefore unable to complete paperwork for injuries requiring more than 72 hours away from work. Patients need to follow up with a primary care or occupational health provider.    **If you have questions regarding the validity of this note, please call the number above.              TARIQ LUNA NP ....................  3/12/2017   3:29 PM

## 2018-09-10 ENCOUNTER — HOSPITAL ENCOUNTER (EMERGENCY)
Facility: OTHER | Age: 38
Discharge: HOME | End: 2018-09-10
Attending: EMERGENCY MEDICINE | Admitting: EMERGENCY MEDICINE
Payer: COMMERCIAL

## 2018-09-10 ENCOUNTER — APPOINTMENT (OUTPATIENT)
Dept: CT IMAGING | Facility: OTHER | Age: 38
End: 2018-09-10
Attending: EMERGENCY MEDICINE
Payer: COMMERCIAL

## 2018-09-10 VITALS
WEIGHT: 270 LBS | SYSTOLIC BLOOD PRESSURE: 147 MMHG | OXYGEN SATURATION: 99 % | RESPIRATION RATE: 16 BRPM | DIASTOLIC BLOOD PRESSURE: 98 MMHG | BODY MASS INDEX: 35.78 KG/M2 | HEART RATE: 91 BPM | HEIGHT: 73 IN | TEMPERATURE: 97.5 F

## 2018-09-10 DIAGNOSIS — F07.81 POST CONCUSSIVE SYNDROME: ICD-10-CM

## 2018-09-10 PROCEDURE — 99284 EMERGENCY DEPT VISIT MOD MDM: CPT | Mod: 25 | Performed by: EMERGENCY MEDICINE

## 2018-09-10 PROCEDURE — 99283 EMERGENCY DEPT VISIT LOW MDM: CPT | Mod: Z6 | Performed by: EMERGENCY MEDICINE

## 2018-09-10 PROCEDURE — 70486 CT MAXILLOFACIAL W/O DYE: CPT

## 2018-09-10 PROCEDURE — 70450 CT HEAD/BRAIN W/O DYE: CPT

## 2018-09-10 RX ORDER — ATORVASTATIN CALCIUM 20 MG/1
20 TABLET, FILM COATED ORAL
COMMUNITY
Start: 2018-07-12

## 2018-09-10 RX ORDER — ASPIRIN 81 MG/1
81 TABLET, CHEWABLE ORAL
COMMUNITY
Start: 2018-07-12

## 2018-09-10 RX ORDER — LISINOPRIL 5 MG/1
5 TABLET ORAL
COMMUNITY
Start: 2018-07-12

## 2018-09-10 ASSESSMENT — ENCOUNTER SYMPTOMS
NAUSEA: 1
HEADACHES: 1
DIZZINESS: 1

## 2018-09-10 NOTE — DISCHARGE INSTRUCTIONS
1.  Follow-up your primary care physician later this week for further disc  2.  You may take ibuprofen/Tylenol as needed for headache  3.  Avoid tasks that require significant concentration and also avoid any contact sports for the next 2 weeks  4.  If having worsening symptoms return to ER

## 2018-09-10 NOTE — ED AVS SNAPSHOT
[facility]    [address]    [place] [address]    Phone:  [phone]    Fax:  [phone]                                       Nura Gonzalez   MRN: [id number]    Department:  [facility]   Date of Visit:  9/10/2018           After Visit Summary Signature Page     I have received my discharge instructions, and my questions have been answered. I have discussed any challenges I see with this plan with the nurse or doctor.    ..........................................................................................................................................  Patient/Patient Representative Signature      ..........................................................................................................................................  Patient Representative Print Name and Relationship to Patient    ..................................................               ................................................  Date                                            Time    ..........................................................................................................................................  Reviewed by Signature/Title    ...................................................              ..............................................  Date                                                            Time          22EPIC Rev 08/18
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
## 2018-09-10 NOTE — ED AVS SNAPSHOT
[facility]    [address]    [place] [address]    Phone:  [phone]    Fax:  [phone]                                       Nura Gonzalez   MRN: [id number]    Department:  [facility]   Date of Visit:  9/10/2018           Patient Information     Date Of Birth          1980        Your diagnoses for this visit were:     Post concussive syndrome        You were seen by Jean-Pierre Melendez MD.        Discharge Instructions       1.  Follow-up your primary care physician later this week for further disc  2.  You may take ibuprofen/Tylenol as needed for headache  3.  Avoid tasks that require significant concentration and also avoid any contact sports for the next 2 weeks  4.  If having worsening symptoms return to ER    24 Hour Appointment Hotline     To schedule an appointment at [facility], please call [phone]. If you don't have a family doctor or clinic, we will help you find one. [facility] clinics are conveniently located to serve the needs of you and your family.           Review of your medicines      Our records show that you are taking the medicines listed below. If these are incorrect, please call your family doctor or clinic.        Dose / Directions Last dose taken    allopurinol 300 MG tablet   Commonly known as:  ZYLOPRIM   Dose:  300 mg   Quantity:  90 tablet        Take 1 tablet (300 mg) by mouth daily   Refills:  3        ARIPiprazole 10 MG tablet   Commonly known as:  ABILIFY   Dose:  10 mg        Take 10 mg by mouth daily   Refills:  0        aspirin 81 MG chewable tablet   Dose:  81 mg        81 mg   Refills:  0        atorvastatin 20 MG tablet   Commonly known as:  LIPITOR   Dose:  20 mg        Take 20 mg by mouth   Refills:  0        blood glucose monitoring lancets   Quantity:  204 each        Use to test blood sugar 2 times daily or as directed.   Refills:  3        blood glucose monitoring test strip   Commonly known as:   ACCU-CHEK GUIDE   Quantity:  200 strip        Use to test blood sugar 2 times daily or as directed.   Refills:  3        Blood Pressure Monitor Kit   Dose:  1 Device   Quantity:  1 kit        1 Device 2 times daily as needed   Refills:  0        clonazePAM 1 MG tablet   Commonly known as:  klonoPIN   Dose:  1 mg        Take 1 mg by mouth 2 times daily   Refills:  0        fish oil-omega-3 fatty acids 1000 MG capsule        daily   Refills:  0        gemfibrozil 600 MG tablet   Commonly known as:  LOPID   Quantity:  90 tablet        Take one tablet daily before supper   Refills:  3        lisinopril 5 MG tablet   Commonly known as:  PRINIVIL/ZESTRIL   Dose:  5 mg        Take 5 mg by mouth   Refills:  0        metFORMIN 500 MG 24 hr tablet   Commonly known as:  GLUCOPHAGE-XR   Dose:  500 mg   Quantity:  90 tablet        Take 1 tablet (500 mg) by mouth daily (with dinner)   Refills:  3        methylphenidate ER 36 MG CR tablet   Commonly known as:  CONCERTA   Dose:  36 mg        Take 36 mg by mouth daily   Refills:  0        propranolol 120 MG 24 hr capsule   Commonly known as:  INDERAL LA   Dose:  120 mg        Take 120 mg by mouth daily   Refills:  0                Procedures and tests performed during your visit     CT Facial Bones without Contrast    CT Head w/o Contrast      Orders Needing Specimen Collection     None      Pending Results     No orders found from 9/8/2018 to 9/11/2018.            Pending Culture Results     No orders found from 9/8/2018 to 9/11/2018.            Pending Results Instructions     If you had any lab results that were not finalized at the time of your Discharge, you can call the ED Lab Result RN at [phone]. You will be contacted by this team for any positive Lab results or changes in treatment. The nurses are available 7 days a week from 10A to 6:30P.  You can leave a message 24 hours per day and they will return your call.        Thank you for choosing [facility]       Thank you  "for choosing [facility] for your care. Our goal is always to provide you with excellent care. Hearing back from our patients is one way we can continue to improve our services. Please take a few minutes to complete the written survey that you may receive in the mail after you visit with us. Thank you!        [employer] Information     [facility] lets you send messages to your doctor, view your test results, renew your prescriptions, schedule appointments and more. To sign up, go to [email or web address] . Click on \"Log in\" on the left side of the screen, which will take you to the Welcome page. Then click on \"Sign up Now\" on the right side of the page.     You will be asked to enter the access code listed below, as well as some personal information. Please follow the directions to create your username and password.     Your access code is: [email or web address]  Expires: 2018  9:10 AM     Your access code will  in 90 days. If you need help or a new code, please call your [facility] clinic or [phone].        Care EveryWhere ID     This is your Care EveryWhere ID. This could be used by other organizations to access your [facility] medical records  [id number]        Equal Access to Services     JOHN YE : Miquel Dawkins, vira hauser, qaevan kabonifacio grace, marychuy richard. So [facility] [phone].    ATENCIÓN: Si habla español, tiene a aguirre disposición servicios gratuitos de asistencia lingüística. Llame al [phone].    We comply with applicable federal civil rights laws and [place] laws. We do not discriminate on the basis of race, color, national origin, age, disability, sex, sexual orientation, or gender identity.            After Visit Summary       This is your record. Keep this with you and show to your community pharmacist(s) and doctor(s) at your next visit.                  "

## 2018-09-10 NOTE — LETTER
[facility]  [address]  [facility] [address]  Phone: [phone]  Fax: [phone]    September 10, 2018        Nura Gonzalez  [address]  [facility] [address]          To whom it may concern:    RE: Nura Gonzalez was seen at [facility] emergency room today and should be excused for work for 1 week          Sincerely,        Jean-Pierre Melendez MD

## 2019-07-22 DIAGNOSIS — E11.9 TYPE 2 DIABETES MELLITUS WITHOUT COMPLICATION, WITHOUT LONG-TERM CURRENT USE OF INSULIN (H): ICD-10-CM

## 2019-07-25 RX ORDER — BLOOD SUGAR DIAGNOSTIC
STRIP MISCELLANEOUS
Qty: 200 STRIP | Refills: 3 | OUTPATIENT
Start: 2019-07-25

## 2019-07-25 NOTE — TELEPHONE ENCOUNTER
[facility] patient.  Leila Nunez ....................  7/25/2019   9:12 AM

## 2020-07-16 ENCOUNTER — ALLIED HEALTH/NURSE VISIT (OUTPATIENT)
Dept: FAMILY MEDICINE | Facility: OTHER | Age: 40
End: 2020-07-16
Attending: FAMILY MEDICINE
Payer: COMMERCIAL

## 2020-07-16 DIAGNOSIS — R50.9 FEVER AND CHILLS: ICD-10-CM

## 2020-07-16 DIAGNOSIS — Z20.822 COVID-19 RULED OUT: Primary | ICD-10-CM

## 2020-07-16 DIAGNOSIS — R06.02 SHORTNESS OF BREATH: ICD-10-CM

## 2020-07-16 PROCEDURE — C9803 HOPD COVID-19 SPEC COLLECT: HCPCS

## 2020-07-16 PROCEDURE — 99207 ZZC NO CHARGE NURSE ONLY: CPT

## 2020-07-16 PROCEDURE — U0003 INFECTIOUS AGENT DETECTION BY NUCLEIC ACID (DNA OR RNA); SEVERE ACUTE RESPIRATORY SYNDROME CORONAVIRUS 2 (SARS-COV-2) (CORONAVIRUS DISEASE [COVID-19]), AMPLIFIED PROBE TECHNIQUE, MAKING USE OF HIGH THROUGHPUT TECHNOLOGIES AS DESCRIBED BY CMS-2020-01-R: HCPCS | Mod: ZL | Performed by: FAMILY MEDICINE

## 2020-07-16 NOTE — LETTER
July 21, 2020        Nura Gonzalez  [address] [address]    This letter provides a written record that you were tested for COVID-19 on 7/16/20.       Your result was negative. This means that we didn t find the virus that causes COVID-19 in your sample. A test may show negative when you do actually have the virus. This can happen when the virus is in the early stages of infection, before you feel illness symptoms.    If you have symptoms   Stay home and away from others (self-isolate) until you meet ALL of the guidelines below:    You ve had no fever--and no medicine that reduces fever--for 3 full days (72 hours). And      Your other symptoms have gotten better. For example, your cough or breathing has improved. And     At least 10 days have passed since your symptoms started.    During this time:    Stay home. Don t go to work, school or anywhere else.     Stay in your own room, including for meals. Use your own bathroom if you can.    Stay away from others in your home. No hugging, kissing or shaking hands. No visitors.    Clean  high touch  surfaces often (doorknobs, counters, handles, etc.). Use a household cleaning spray or wipes. You can find a full list on the EPA website at www.epa.gov/pesticide-registration/list-n-disinfectants-use-against-sars-cov-2.    Cover your mouth and nose with a mask, tissue or washcloth to avoid spreading germs.    Wash your hands and face often with soap and water.    Going back to work  Check with your employer for any guidelines to follow for going back to work.    Employers: This document serves as formal notice that your employee tested negative for COVID-19, as of the testing date shown above.

## 2020-07-17 LAB
SARS-COV-2 RNA SPEC QL NAA+PROBE: NOT DETECTED
SPECIMEN SOURCE: NORMAL

## 2020-12-27 ENCOUNTER — HEALTH MAINTENANCE LETTER (OUTPATIENT)
Age: 40
End: 2020-12-27

## 2021-03-26 ENCOUNTER — TRANSFERRED RECORDS (OUTPATIENT)
Dept: HEALTH INFORMATION MANAGEMENT | Facility: OTHER | Age: 41
End: 2021-03-26

## 2021-03-26 LAB — RETINOPATHY: NEGATIVE

## 2021-08-14 ENCOUNTER — HEALTH MAINTENANCE LETTER (OUTPATIENT)
Age: 41
End: 2021-08-14

## 2021-10-09 ENCOUNTER — HEALTH MAINTENANCE LETTER (OUTPATIENT)
Age: 41
End: 2021-10-09

## 2022-01-26 NOTE — TELEPHONE ENCOUNTER
Patient Information     Patient Name MRN Sex Nura Madsen [id number] Male 1980      Telephone Encounter by Quiana Pritchard at 3/10/2017 10:08 AM     Author:  Quiana Pritchard Service:  (none) Author Type:  (none)     Filed:  3/10/2017 10:09 AM Encounter Date:  3/10/2017 Status:  Signed     :  Quiana Pritchard            TJR - PT WOULD LIKE A SLEEP STUDY DONE.  HE SAID YOU HAD DISCUSSED IT BEFORE, BUT HE DOES NOT RECALL GETTING A CALL TO SCHEDULE ONE.   HE WOULD LIKE TO DO THAT NOW.  WILL YOU PLEASE PUT AN ORDER IN?  PLEASE CALL PT WITH QUESTIONS.     Quiana Pritchard ....................  3/10/2017   10:09 AM           [id number] [facility]

## 2022-01-29 ENCOUNTER — HEALTH MAINTENANCE LETTER (OUTPATIENT)
Age: 42
End: 2022-01-29

## 2022-03-26 ENCOUNTER — HEALTH MAINTENANCE LETTER (OUTPATIENT)
Age: 42
End: 2022-03-26

## 2022-09-17 ENCOUNTER — HEALTH MAINTENANCE LETTER (OUTPATIENT)
Age: 42
End: 2022-09-17

## 2023-01-28 ENCOUNTER — HEALTH MAINTENANCE LETTER (OUTPATIENT)
Age: 43
End: 2023-01-28

## 2023-05-06 ENCOUNTER — HEALTH MAINTENANCE LETTER (OUTPATIENT)
Age: 43
End: 2023-05-06

## 2023-07-30 ENCOUNTER — HEALTH MAINTENANCE LETTER (OUTPATIENT)
Age: 43
End: 2023-07-30

## 2024-02-24 ENCOUNTER — HEALTH MAINTENANCE LETTER (OUTPATIENT)
Age: 44
End: 2024-02-24